# Patient Record
Sex: FEMALE | Race: OTHER | NOT HISPANIC OR LATINO | URBAN - METROPOLITAN AREA
[De-identification: names, ages, dates, MRNs, and addresses within clinical notes are randomized per-mention and may not be internally consistent; named-entity substitution may affect disease eponyms.]

---

## 2017-01-03 RX ORDER — DENOSUMAB 60 MG/ML
60 INJECTION SUBCUTANEOUS ONCE
Qty: 0 | Refills: 0 | Status: DISCONTINUED | OUTPATIENT
Start: 2017-01-04 | End: 2017-01-19

## 2017-01-04 ENCOUNTER — OUTPATIENT (OUTPATIENT)
Dept: OUTPATIENT SERVICES | Facility: HOSPITAL | Age: 82
LOS: 1 days | End: 2017-01-04
Payer: MEDICARE

## 2017-01-04 DIAGNOSIS — M81.0 AGE-RELATED OSTEOPOROSIS WITHOUT CURRENT PATHOLOGICAL FRACTURE: ICD-10-CM

## 2017-01-04 PROCEDURE — 96401 CHEMO ANTI-NEOPL SQ/IM: CPT

## 2017-06-07 ENCOUNTER — APPOINTMENT (OUTPATIENT)
Dept: HEART AND VASCULAR | Facility: CLINIC | Age: 82
End: 2017-06-07

## 2017-06-07 VITALS
BODY MASS INDEX: 20.55 KG/M2 | HEART RATE: 77 BPM | SYSTOLIC BLOOD PRESSURE: 112 MMHG | HEIGHT: 63 IN | DIASTOLIC BLOOD PRESSURE: 72 MMHG | WEIGHT: 116 LBS

## 2017-06-07 DIAGNOSIS — Z78.9 OTHER SPECIFIED HEALTH STATUS: ICD-10-CM

## 2017-06-08 ENCOUNTER — MEDICATION RENEWAL (OUTPATIENT)
Age: 82
End: 2017-06-08

## 2017-06-08 ENCOUNTER — FORM ENCOUNTER (OUTPATIENT)
Age: 82
End: 2017-06-08

## 2017-06-09 ENCOUNTER — OUTPATIENT (OUTPATIENT)
Dept: OUTPATIENT SERVICES | Facility: HOSPITAL | Age: 82
LOS: 1 days | Discharge: HOME | End: 2017-06-09
Payer: MEDICARE

## 2017-06-09 DIAGNOSIS — I48.91 UNSPECIFIED ATRIAL FIBRILLATION: ICD-10-CM

## 2017-06-09 LAB
ANION GAP SERPL CALC-SCNC: 14 MMOL/L — SIGNIFICANT CHANGE UP (ref 5–17)
APTT BLD: 28.8 SEC — SIGNIFICANT CHANGE UP (ref 27.5–37.4)
BASOPHILS NFR BLD AUTO: 0.6 % — SIGNIFICANT CHANGE UP (ref 0–2)
BUN SERPL-MCNC: 36 MG/DL — HIGH (ref 7–23)
CALCIUM SERPL-MCNC: 9.4 MG/DL — SIGNIFICANT CHANGE UP (ref 8.4–10.5)
CHLORIDE SERPL-SCNC: 105 MMOL/L — SIGNIFICANT CHANGE UP (ref 96–108)
CO2 SERPL-SCNC: 21 MMOL/L — LOW (ref 22–31)
CREAT SERPL-MCNC: 0.8 MG/DL — SIGNIFICANT CHANGE UP (ref 0.5–1.3)
EOSINOPHIL NFR BLD AUTO: 1.7 % — SIGNIFICANT CHANGE UP (ref 0–6)
GLUCOSE SERPL-MCNC: 108 MG/DL — HIGH (ref 70–99)
HCT VFR BLD CALC: 38.9 % — SIGNIFICANT CHANGE UP (ref 34.5–45)
HGB BLD-MCNC: 12.6 G/DL — SIGNIFICANT CHANGE UP (ref 11.5–15.5)
INR BLD: 1.18 — HIGH (ref 0.88–1.16)
LYMPHOCYTES # BLD AUTO: 12.8 % — LOW (ref 13–44)
MCHC RBC-ENTMCNC: 30.6 PG — SIGNIFICANT CHANGE UP (ref 27–34)
MCHC RBC-ENTMCNC: 32.4 G/DL — SIGNIFICANT CHANGE UP (ref 32–36)
MCV RBC AUTO: 94.4 FL — SIGNIFICANT CHANGE UP (ref 80–100)
MONOCYTES NFR BLD AUTO: 7.7 % — SIGNIFICANT CHANGE UP (ref 2–14)
NEUTROPHILS NFR BLD AUTO: 77.2 % — HIGH (ref 43–77)
PLATELET # BLD AUTO: 172 K/UL — SIGNIFICANT CHANGE UP (ref 150–400)
POTASSIUM SERPL-MCNC: 4.6 MMOL/L — SIGNIFICANT CHANGE UP (ref 3.5–5.3)
POTASSIUM SERPL-SCNC: 4.6 MMOL/L — SIGNIFICANT CHANGE UP (ref 3.5–5.3)
PROTHROM AB SERPL-ACNC: 13.1 SEC — HIGH (ref 9.8–12.7)
RBC # BLD: 4.12 M/UL — SIGNIFICANT CHANGE UP (ref 3.8–5.2)
RBC # FLD: 15.4 % — SIGNIFICANT CHANGE UP (ref 10.3–16.9)
SODIUM SERPL-SCNC: 140 MMOL/L — SIGNIFICANT CHANGE UP (ref 135–145)
WBC # BLD: 5.3 K/UL — SIGNIFICANT CHANGE UP (ref 3.8–10.5)
WBC # FLD AUTO: 5.3 K/UL — SIGNIFICANT CHANGE UP (ref 3.8–10.5)

## 2017-06-09 PROCEDURE — 85025 COMPLETE CBC W/AUTO DIFF WBC: CPT

## 2017-06-09 PROCEDURE — 92960 CARDIOVERSION ELECTRIC EXT: CPT

## 2017-06-09 PROCEDURE — 93312 ECHO TRANSESOPHAGEAL: CPT

## 2017-06-09 PROCEDURE — 93320 DOPPLER ECHO COMPLETE: CPT | Mod: 26

## 2017-06-09 PROCEDURE — 85730 THROMBOPLASTIN TIME PARTIAL: CPT

## 2017-06-09 PROCEDURE — 93312 ECHO TRANSESOPHAGEAL: CPT | Mod: 26

## 2017-06-09 PROCEDURE — 85610 PROTHROMBIN TIME: CPT

## 2017-06-09 PROCEDURE — 80048 BASIC METABOLIC PNL TOTAL CA: CPT

## 2017-06-09 PROCEDURE — 93325 DOPPLER ECHO COLOR FLOW MAPG: CPT | Mod: 26

## 2017-06-22 ENCOUNTER — APPOINTMENT (OUTPATIENT)
Dept: HEART AND VASCULAR | Facility: CLINIC | Age: 82
End: 2017-06-22

## 2017-06-22 VITALS
SYSTOLIC BLOOD PRESSURE: 126 MMHG | DIASTOLIC BLOOD PRESSURE: 72 MMHG | HEART RATE: 69 BPM | BODY MASS INDEX: 21.26 KG/M2 | HEIGHT: 63 IN | WEIGHT: 120 LBS

## 2017-06-29 ENCOUNTER — OUTPATIENT (OUTPATIENT)
Dept: OUTPATIENT SERVICES | Facility: HOSPITAL | Age: 82
LOS: 1 days | End: 2017-06-29
Payer: MEDICARE

## 2017-06-29 DIAGNOSIS — M81.0 AGE-RELATED OSTEOPOROSIS WITHOUT CURRENT PATHOLOGICAL FRACTURE: ICD-10-CM

## 2017-06-29 PROCEDURE — 96372 THER/PROPH/DIAG INJ SC/IM: CPT

## 2017-06-29 RX ORDER — DENOSUMAB 60 MG/ML
60 INJECTION SUBCUTANEOUS ONCE
Qty: 0 | Refills: 0 | Status: DISCONTINUED | OUTPATIENT
Start: 2017-06-29 | End: 2017-07-14

## 2017-06-30 ENCOUNTER — RX RENEWAL (OUTPATIENT)
Age: 82
End: 2017-06-30

## 2017-10-10 ENCOUNTER — APPOINTMENT (OUTPATIENT)
Dept: HEART AND VASCULAR | Facility: CLINIC | Age: 82
End: 2017-10-10
Payer: MEDICARE

## 2017-10-10 VITALS
BODY MASS INDEX: 18.78 KG/M2 | HEART RATE: 98 BPM | SYSTOLIC BLOOD PRESSURE: 112 MMHG | DIASTOLIC BLOOD PRESSURE: 70 MMHG | OXYGEN SATURATION: 98 % | WEIGHT: 110 LBS | TEMPERATURE: 97.6 F | HEIGHT: 64.17 IN

## 2017-10-10 DIAGNOSIS — Z87.39 PERSONAL HISTORY OF OTHER DISEASES OF THE MUSCULOSKELETAL SYSTEM AND CONNECTIVE TISSUE: ICD-10-CM

## 2017-10-10 DIAGNOSIS — M80.80XK OTHER OSTEOPOROSIS WITH CURRENT PATHOLOGICAL FRACTURE, UNSPECIFIED SITE, SUBSEQUENT ENCOUNTER FOR FRACTURE WITH NONUNION: ICD-10-CM

## 2017-10-10 DIAGNOSIS — M15.9 POLYOSTEOARTHRITIS, UNSPECIFIED: ICD-10-CM

## 2017-10-10 DIAGNOSIS — F41.1 GENERALIZED ANXIETY DISORDER: ICD-10-CM

## 2017-10-10 DIAGNOSIS — Z86.79 PERSONAL HISTORY OF OTHER DISEASES OF THE CIRCULATORY SYSTEM: ICD-10-CM

## 2017-10-10 PROCEDURE — 99214 OFFICE O/P EST MOD 30 MIN: CPT | Mod: 25

## 2017-10-10 PROCEDURE — 90662 IIV NO PRSV INCREASED AG IM: CPT

## 2017-10-10 PROCEDURE — G0008: CPT

## 2017-10-10 PROCEDURE — 36415 COLL VENOUS BLD VENIPUNCTURE: CPT

## 2017-10-11 LAB
ALBUMIN SERPL ELPH-MCNC: 4.4 G/DL
ALP BLD-CCNC: 69 U/L
ALT SERPL-CCNC: 22 U/L
ANION GAP SERPL CALC-SCNC: 15 MMOL/L
AST SERPL-CCNC: 33 U/L
BASOPHILS # BLD AUTO: 0.04 K/UL
BASOPHILS NFR BLD AUTO: 0.7 %
BILIRUB SERPL-MCNC: 2.2 MG/DL
BUN SERPL-MCNC: 27 MG/DL
CALCIUM SERPL-MCNC: 9.9 MG/DL
CHLORIDE SERPL-SCNC: 105 MMOL/L
CO2 SERPL-SCNC: 23 MMOL/L
CREAT SERPL-MCNC: 0.93 MG/DL
EOSINOPHIL # BLD AUTO: 0.1 K/UL
EOSINOPHIL NFR BLD AUTO: 1.7 %
GLUCOSE SERPL-MCNC: 97 MG/DL
HBA1C MFR BLD HPLC: 5.8 %
HCT VFR BLD CALC: 42.6 %
HGB BLD-MCNC: 13.5 G/DL
IMM GRANULOCYTES NFR BLD AUTO: 0 %
LYMPHOCYTES # BLD AUTO: 0.94 K/UL
LYMPHOCYTES NFR BLD AUTO: 15.7 %
MAN DIFF?: NORMAL
MCHC RBC-ENTMCNC: 31.5 PG
MCHC RBC-ENTMCNC: 31.7 GM/DL
MCV RBC AUTO: 99.5 FL
MONOCYTES # BLD AUTO: 0.6 K/UL
MONOCYTES NFR BLD AUTO: 10.1 %
NEUTROPHILS # BLD AUTO: 4.29 K/UL
NEUTROPHILS NFR BLD AUTO: 71.8 %
NT-PROBNP SERPL-MCNC: 5712 PG/ML
PLATELET # BLD AUTO: 173 K/UL
POTASSIUM SERPL-SCNC: 4.5 MMOL/L
PROT SERPL-MCNC: 6.6 G/DL
RBC # BLD: 4.28 M/UL
RBC # FLD: 15 %
SODIUM SERPL-SCNC: 143 MMOL/L
WBC # FLD AUTO: 5.97 K/UL

## 2017-12-12 ENCOUNTER — APPOINTMENT (OUTPATIENT)
Dept: HEART AND VASCULAR | Facility: CLINIC | Age: 82
End: 2017-12-12
Payer: MEDICARE

## 2017-12-12 VITALS
HEART RATE: 69 BPM | TEMPERATURE: 97.3 F | SYSTOLIC BLOOD PRESSURE: 100 MMHG | BODY MASS INDEX: 18.78 KG/M2 | DIASTOLIC BLOOD PRESSURE: 68 MMHG | WEIGHT: 109.99 LBS | OXYGEN SATURATION: 98 % | HEIGHT: 64 IN

## 2017-12-12 PROCEDURE — 99214 OFFICE O/P EST MOD 30 MIN: CPT | Mod: 25

## 2017-12-26 ENCOUNTER — APPOINTMENT (OUTPATIENT)
Dept: HEART AND VASCULAR | Facility: CLINIC | Age: 82
End: 2017-12-26

## 2018-01-22 ENCOUNTER — RX RENEWAL (OUTPATIENT)
Age: 83
End: 2018-01-22

## 2018-02-08 ENCOUNTER — RX RENEWAL (OUTPATIENT)
Age: 83
End: 2018-02-08

## 2018-02-13 ENCOUNTER — APPOINTMENT (OUTPATIENT)
Dept: HEART AND VASCULAR | Facility: CLINIC | Age: 83
End: 2018-02-13
Payer: MEDICARE

## 2018-02-13 VITALS
DIASTOLIC BLOOD PRESSURE: 80 MMHG | SYSTOLIC BLOOD PRESSURE: 140 MMHG | OXYGEN SATURATION: 97 % | HEART RATE: 69 BPM | BODY MASS INDEX: 18.61 KG/M2 | TEMPERATURE: 98 F | WEIGHT: 109 LBS | HEIGHT: 64 IN

## 2018-02-13 DIAGNOSIS — R35.0 FREQUENCY OF MICTURITION: ICD-10-CM

## 2018-02-13 PROCEDURE — 99214 OFFICE O/P EST MOD 30 MIN: CPT

## 2018-02-14 ENCOUNTER — RX RENEWAL (OUTPATIENT)
Age: 83
End: 2018-02-14

## 2018-02-14 LAB
APPEARANCE: ABNORMAL
BACTERIA: ABNORMAL
BILIRUBIN URINE: NEGATIVE
BLOOD URINE: NEGATIVE
CALCIUM OXALATE CRYSTALS: ABNORMAL
COLOR: YELLOW
GLUCOSE QUALITATIVE U: NEGATIVE MG/DL
HYALINE CASTS: 0 /LPF
KETONES URINE: NEGATIVE
LEUKOCYTE ESTERASE URINE: NEGATIVE
MICROSCOPIC-UA: NORMAL
NITRITE URINE: POSITIVE
PH URINE: 6.5
PROTEIN URINE: ABNORMAL MG/DL
RED BLOOD CELLS URINE: 2 /HPF
SPECIFIC GRAVITY URINE: 1.02
SQUAMOUS EPITHELIAL CELLS: 1 /HPF
UROBILINOGEN URINE: NEGATIVE MG/DL
WHITE BLOOD CELLS URINE: 1 /HPF

## 2018-04-24 ENCOUNTER — LABORATORY RESULT (OUTPATIENT)
Age: 83
End: 2018-04-24

## 2018-04-24 ENCOUNTER — APPOINTMENT (OUTPATIENT)
Dept: HEART AND VASCULAR | Facility: CLINIC | Age: 83
End: 2018-04-24
Payer: MEDICARE

## 2018-04-24 VITALS
DIASTOLIC BLOOD PRESSURE: 80 MMHG | BODY MASS INDEX: 19.13 KG/M2 | SYSTOLIC BLOOD PRESSURE: 112 MMHG | WEIGHT: 107.98 LBS | TEMPERATURE: 98.5 F | OXYGEN SATURATION: 99 % | HEIGHT: 63 IN | HEART RATE: 72 BPM

## 2018-04-24 PROCEDURE — 99214 OFFICE O/P EST MOD 30 MIN: CPT | Mod: 25

## 2018-04-24 PROCEDURE — 93000 ELECTROCARDIOGRAM COMPLETE: CPT

## 2018-04-30 ENCOUNTER — RX RENEWAL (OUTPATIENT)
Age: 83
End: 2018-04-30

## 2018-04-30 LAB
APPEARANCE: ABNORMAL
BILIRUBIN URINE: NEGATIVE
BLOOD URINE: ABNORMAL
COLOR: YELLOW
GLUCOSE QUALITATIVE U: NEGATIVE MG/DL
KETONES URINE: NEGATIVE
LEUKOCYTE ESTERASE URINE: ABNORMAL
NITRITE URINE: POSITIVE
PH URINE: 5
PROTEIN URINE: 30 MG/DL
SPECIFIC GRAVITY URINE: 1.02
UROBILINOGEN URINE: NEGATIVE MG/DL

## 2018-06-18 ENCOUNTER — RX RENEWAL (OUTPATIENT)
Age: 83
End: 2018-06-18

## 2018-07-17 ENCOUNTER — APPOINTMENT (OUTPATIENT)
Dept: HEART AND VASCULAR | Facility: CLINIC | Age: 83
End: 2018-07-17
Payer: MEDICARE

## 2018-07-17 VITALS
SYSTOLIC BLOOD PRESSURE: 136 MMHG | BODY MASS INDEX: 20.02 KG/M2 | DIASTOLIC BLOOD PRESSURE: 89 MMHG | WEIGHT: 112.99 LBS | TEMPERATURE: 97.1 F | HEART RATE: 62 BPM | HEIGHT: 63 IN | OXYGEN SATURATION: 98 %

## 2018-07-17 PROCEDURE — 99214 OFFICE O/P EST MOD 30 MIN: CPT

## 2018-10-16 ENCOUNTER — APPOINTMENT (OUTPATIENT)
Dept: HEART AND VASCULAR | Facility: CLINIC | Age: 83
End: 2018-10-16
Payer: MEDICARE

## 2018-10-16 VITALS
WEIGHT: 112.99 LBS | DIASTOLIC BLOOD PRESSURE: 94 MMHG | SYSTOLIC BLOOD PRESSURE: 131 MMHG | TEMPERATURE: 97.5 F | BODY MASS INDEX: 20.02 KG/M2 | OXYGEN SATURATION: 100 % | HEIGHT: 63 IN | HEART RATE: 74 BPM

## 2018-10-16 PROCEDURE — 36415 COLL VENOUS BLD VENIPUNCTURE: CPT

## 2018-10-16 PROCEDURE — 99214 OFFICE O/P EST MOD 30 MIN: CPT

## 2018-10-17 LAB
ALBUMIN SERPL ELPH-MCNC: 4.4 G/DL
ALP BLD-CCNC: 78 U/L
ALT SERPL-CCNC: 16 U/L
ANION GAP SERPL CALC-SCNC: 15 MMOL/L
AST SERPL-CCNC: 20 U/L
BASOPHILS # BLD AUTO: 0.04 K/UL
BASOPHILS NFR BLD AUTO: 0.7 %
BILIRUB SERPL-MCNC: 1.9 MG/DL
BUN SERPL-MCNC: 25 MG/DL
CALCIUM SERPL-MCNC: 9.8 MG/DL
CHLORIDE SERPL-SCNC: 106 MMOL/L
CHOLEST SERPL-MCNC: 185 MG/DL
CHOLEST/HDLC SERPL: 1.9 RATIO
CO2 SERPL-SCNC: 24 MMOL/L
CREAT SERPL-MCNC: 0.83 MG/DL
EOSINOPHIL # BLD AUTO: 0.09 K/UL
EOSINOPHIL NFR BLD AUTO: 1.7 %
GLUCOSE SERPL-MCNC: 102 MG/DL
HBA1C MFR BLD HPLC: 5.9 %
HCT VFR BLD CALC: 42 %
HDLC SERPL-MCNC: 99 MG/DL
HGB BLD-MCNC: 12.9 G/DL
IMM GRANULOCYTES NFR BLD AUTO: 0 %
LDLC SERPL CALC-MCNC: 72 MG/DL
LYMPHOCYTES # BLD AUTO: 0.85 K/UL
LYMPHOCYTES NFR BLD AUTO: 15.8 %
MAN DIFF?: NORMAL
MCHC RBC-ENTMCNC: 30.7 GM/DL
MCHC RBC-ENTMCNC: 31.2 PG
MCV RBC AUTO: 101.7 FL
MONOCYTES # BLD AUTO: 0.38 K/UL
MONOCYTES NFR BLD AUTO: 7.1 %
NEUTROPHILS # BLD AUTO: 4.01 K/UL
NEUTROPHILS NFR BLD AUTO: 74.7 %
NT-PROBNP SERPL-MCNC: 7066 PG/ML
PLATELET # BLD AUTO: 189 K/UL
POTASSIUM SERPL-SCNC: 4.5 MMOL/L
PROT SERPL-MCNC: 6.8 G/DL
RBC # BLD: 4.13 M/UL
RBC # FLD: 15.4 %
SODIUM SERPL-SCNC: 145 MMOL/L
TRIGL SERPL-MCNC: 71 MG/DL
WBC # FLD AUTO: 5.37 K/UL

## 2018-12-18 ENCOUNTER — APPOINTMENT (OUTPATIENT)
Dept: HEART AND VASCULAR | Facility: CLINIC | Age: 83
End: 2018-12-18
Payer: MEDICARE

## 2018-12-18 VITALS
DIASTOLIC BLOOD PRESSURE: 88 MMHG | BODY MASS INDEX: 19.13 KG/M2 | SYSTOLIC BLOOD PRESSURE: 131 MMHG | HEIGHT: 63 IN | HEART RATE: 91 BPM | TEMPERATURE: 97.8 F | OXYGEN SATURATION: 99 % | WEIGHT: 107.98 LBS

## 2018-12-18 PROCEDURE — 99214 OFFICE O/P EST MOD 30 MIN: CPT

## 2018-12-18 PROCEDURE — 93000 ELECTROCARDIOGRAM COMPLETE: CPT

## 2018-12-18 NOTE — PHYSICAL EXAM
[General Appearance - Well Developed] : well developed [Normal Appearance] : normal appearance [Well Groomed] : well groomed [General Appearance - Well Nourished] : well nourished [No Deformities] : no deformities [General Appearance - In No Acute Distress] : no acute distress [Normal Conjunctiva] : the conjunctiva exhibited no abnormalities [Eyelids - No Xanthelasma] : the eyelids demonstrated no xanthelasmas [Normal Oral Mucosa] : normal oral mucosa [No Oral Pallor] : no oral pallor [No Oral Cyanosis] : no oral cyanosis [Respiration, Rhythm And Depth] : normal respiratory rhythm and effort [Exaggerated Use Of Accessory Muscles For Inspiration] : no accessory muscle use [Auscultation Breath Sounds / Voice Sounds] : lungs were clear to auscultation bilaterally [Abdomen Soft] : soft [Abdomen Tenderness] : non-tender [Abdomen Mass (___ Cm)] : no abdominal mass palpated [Abnormal Walk] : normal gait [Nail Clubbing] : no clubbing of the fingernails [Cyanosis, Localized] : no localized cyanosis [Petechial Hemorrhages (___cm)] : no petechial hemorrhages [Skin Color & Pigmentation] : normal skin color and pigmentation [] : no rash [No Venous Stasis] : no venous stasis [Skin Lesions] : no skin lesions [No Skin Ulcers] : no skin ulcer [No Xanthoma] : no  xanthoma was observed [Oriented To Time, Place, And Person] : oriented to person, place, and time [Affect] : the affect was normal [Mood] : the mood was normal [FreeTextEntry1] : Irregular, variable S1, apical systolic murmur

## 2018-12-18 NOTE — ASSESSMENT
[FreeTextEntry1] : A Fib- The patient's [id number] score = 6. Pt failed one DC CV. Continue Eliquis 2.5mg BID.  Spoke to pt and discovered that she is taking it only once daily, will have her take it BID.\par \par SOB/GARCIA- check labs including BNP, needs updated echo (ordered for 4/2019)and never had a stress test. 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 Has made a good recovery except for memory\par \par HTN- stable\par \par MR- Lasix for SOB and edema.  Will update echo, then see if she is a candidate for any MV procedure.\par \par Depression- Sertraline renewed\par \par Urin Frequency- will check UA first.  Had a UTI treated with Cipro.  \par \par Prediabetes- diet reviewed\par \par Health Maintainence- Given new flu shot and Shingles vac @ , needs Shingrix #2(reminded).\par Had Pneumovax around 2015. Had fluzone 2018\par \par LBBB-  never had stress test, will update echo\par

## 2018-12-18 NOTE — HISTORY OF PRESENT ILLNESS
[FreeTextEntry1] : EP- Dr Junior\par Endo- Dr Vaughn\par Dentist- Dr Masterson\par Orthodontist- Dr Chevy Merino [phone]\par Orthodontist- Dr Prater\par \par \par Had Fluzone HD and Shingles vaccine in Dec/Jan 2017-18.  Needs 2nd Shingrix\par Had flu shot for 2018-19.

## 2018-12-18 NOTE — REASON FOR VISIT
[Follow-Up - Clinic] : a clinic follow-up of [Atrial Fibrillation] : atrial fibrillation [Mitral Regurgitation] : mitral regurgitation [FreeTextEntry1] : 82 y/o F presented March 2017 with slurred speech and was unable to find words and dial a phone number.  Found to be in AF and sent to Dr Smith, MRI showed 2 old right CVAs and a new left frontal CVA. Started on Eliquis. Saw Dr Junior who did a TRINA and DC CV which did not hold at 3 weeks f/u.  Pt opted to remain in AF.  Today is feeling well with minor memory issues.  All meds reviewed which revealed she was taking the Eliquis only once daily, she will now take BID. Discussed again, pt taking 2 pills once daily, told to split them 12 hrs apart.  On 10/16/18 pt noted 1 wk of significant GARCIA. No overt CHF.  Differential includes AF, CHF, CAD(never assessed) and MR.\par \par EKG: Atrial Fibrillation with a LBBB and LAD, non specific ST-T wave abnormalities. 12/18/18

## 2018-12-18 NOTE — REVIEW OF SYSTEMS
[Memory Lapses Or Loss] : memory lapses or loss [Anxiety] : anxiety [Negative] : Heme/Lymph [FreeTextEntry3] : frequency

## 2019-01-25 ENCOUNTER — RX RENEWAL (OUTPATIENT)
Age: 84
End: 2019-01-25

## 2019-02-01 ENCOUNTER — RX RENEWAL (OUTPATIENT)
Age: 84
End: 2019-02-01

## 2019-02-26 ENCOUNTER — LABORATORY RESULT (OUTPATIENT)
Age: 84
End: 2019-02-26

## 2019-02-26 ENCOUNTER — APPOINTMENT (OUTPATIENT)
Dept: HEART AND VASCULAR | Facility: CLINIC | Age: 84
End: 2019-02-26
Payer: MEDICARE

## 2019-02-26 VITALS
HEIGHT: 63 IN | WEIGHT: 111.99 LBS | BODY MASS INDEX: 19.84 KG/M2 | OXYGEN SATURATION: 94 % | DIASTOLIC BLOOD PRESSURE: 70 MMHG | SYSTOLIC BLOOD PRESSURE: 110 MMHG | HEART RATE: 65 BPM | TEMPERATURE: 97.3 F

## 2019-02-26 DIAGNOSIS — R53.81 OTHER MALAISE: ICD-10-CM

## 2019-02-26 DIAGNOSIS — R53.83 OTHER MALAISE: ICD-10-CM

## 2019-02-26 PROCEDURE — 36415 COLL VENOUS BLD VENIPUNCTURE: CPT

## 2019-02-26 PROCEDURE — 99214 OFFICE O/P EST MOD 30 MIN: CPT

## 2019-02-26 NOTE — ASSESSMENT
[FreeTextEntry1] : Fatigue- will w/u with a CPK, BNP and TSH.  Never screened for CAD, will do Pharm Nuc.  Wt up 4 lbs, moderate foot edema.  Not taking Lasix, told to resume\par \par A Fib- The patient's [id number] score = 6. Pt failed one DC CV. Continue Eliquis 2.5mg BID.  Spoke to pt and discovered that she is taking it only once daily, will have her take it BID.\par \par SOB/GARCIA- check labs including BNP, needs updated echo (ordered for 4/2019)and never had a stress test. 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 Has made a good recovery except for memory\par \par HTN- stable\par \par MR- Lasix for SOB and edema.  Will update echo, then see if she is a candidate for any MV procedure.\par \par Depression- Sertraline renewed\par \par Urin Frequency- will check UA first.  Had a UTI treated with Cipro.  \par \par Prediabetes- diet reviewed\par \par Health Maintainence- Given new flu shot and Shingles vac @ , needs Shingrix #2(reminded).\par Had Pneumovax around 2015. Had fluzone 2018\par \par LBBB-  never had stress test, will update echo\par

## 2019-02-26 NOTE — HISTORY OF PRESENT ILLNESS
[FreeTextEntry1] : EP- Dr Junior\par Endo- Dr Vaughn\par Dentist- Dr Masterson\par Orthodontist- Dr Chevy Merino [phone]\par Orthodontist- Dr Prater\par \par \par Had Fluzone HD and Shingles vaccine in Dec/Jan 2017-18.  Needs 2nd Shingrix\par Had flu shot for 2018-19.

## 2019-02-26 NOTE — PHYSICAL EXAM
[General Appearance - Well Developed] : well developed [Normal Appearance] : normal appearance [Well Groomed] : well groomed [General Appearance - Well Nourished] : well nourished [No Deformities] : no deformities [General Appearance - In No Acute Distress] : no acute distress [Normal Conjunctiva] : the conjunctiva exhibited no abnormalities [Eyelids - No Xanthelasma] : the eyelids demonstrated no xanthelasmas [Normal Oral Mucosa] : normal oral mucosa [No Oral Pallor] : no oral pallor [No Oral Cyanosis] : no oral cyanosis [Respiration, Rhythm And Depth] : normal respiratory rhythm and effort [Exaggerated Use Of Accessory Muscles For Inspiration] : no accessory muscle use [Auscultation Breath Sounds / Voice Sounds] : lungs were clear to auscultation bilaterally [Abdomen Soft] : soft [Abdomen Tenderness] : non-tender [Abdomen Mass (___ Cm)] : no abdominal mass palpated [Abnormal Walk] : normal gait [Nail Clubbing] : no clubbing of the fingernails [Cyanosis, Localized] : no localized cyanosis [Petechial Hemorrhages (___cm)] : no petechial hemorrhages [Skin Color & Pigmentation] : normal skin color and pigmentation [] : no rash [No Venous Stasis] : no venous stasis [Skin Lesions] : no skin lesions [No Skin Ulcers] : no skin ulcer [No Xanthoma] : no  xanthoma was observed [Oriented To Time, Place, And Person] : oriented to person, place, and time [Affect] : the affect was normal [Mood] : the mood was normal [FreeTextEntry1] : moderate foot edema

## 2019-02-26 NOTE — REASON FOR VISIT
[Follow-Up - Clinic] : a clinic follow-up of [Atrial Fibrillation] : atrial fibrillation [Mitral Regurgitation] : mitral regurgitation [FreeTextEntry1] : 82 y/o F presented March 2017 with slurred speech and was unable to find words and dial a phone number.  Found to be in AF and sent to Dr Smith, MRI showed 2 old right CVAs and a new left frontal CVA. Started on Eliquis. Saw Dr Junior who did a TRINA and DC CV which did not hold at 3 weeks f/u.  Pt opted to remain in AF.  Today is feeling well with minor memory issues.  All meds reviewed which revealed she was taking the Eliquis only once daily, she will now take BID. Discussed again, pt taking 2 pills once daily, told to split them 12 hrs apart.  On 10/16/18 pt noted 1 wk of significant GARCIA. No overt CHF.  Differential includes AF, CHF, CAD(never assessed) and MR.\par \par EKG: Atrial Fibrillation with a LBBB and LAD, non specific ST-T wave abnormalities. 12/18/18\par \par 2/26/19- Here c/o fatigue and weakness.  Will check labs and screen for CAD.

## 2019-02-28 LAB
CK SERPL-CCNC: 50 U/L
TSH SERPL-ACNC: 4.71 UIU/ML

## 2019-03-04 LAB — NT-PROBNP SERPL-MCNC: 7347 PG/ML

## 2019-05-07 ENCOUNTER — APPOINTMENT (OUTPATIENT)
Dept: HEART AND VASCULAR | Facility: CLINIC | Age: 84
End: 2019-05-07
Payer: MEDICARE

## 2019-05-07 ENCOUNTER — LABORATORY RESULT (OUTPATIENT)
Age: 84
End: 2019-05-07

## 2019-05-07 VITALS
BODY MASS INDEX: 20.02 KG/M2 | DIASTOLIC BLOOD PRESSURE: 70 MMHG | OXYGEN SATURATION: 98 % | TEMPERATURE: 97.5 F | SYSTOLIC BLOOD PRESSURE: 110 MMHG | HEART RATE: 72 BPM | WEIGHT: 112.99 LBS | HEIGHT: 63 IN

## 2019-05-07 DIAGNOSIS — R31.0 GROSS HEMATURIA: ICD-10-CM

## 2019-05-07 DIAGNOSIS — R06.02 SHORTNESS OF BREATH: ICD-10-CM

## 2019-05-07 PROCEDURE — 99214 OFFICE O/P EST MOD 30 MIN: CPT

## 2019-05-07 PROCEDURE — 93000 ELECTROCARDIOGRAM COMPLETE: CPT

## 2019-05-07 NOTE — HISTORY OF PRESENT ILLNESS
[FreeTextEntry1] : EP- Dr Junior\par Endo- Dr Vaughn\par Dentist- Dr Masterson\par Orthodontist- Dr Chevy Merino [phone]\par Orthodontist- Dr Prater\par \par \par Had Fluzone HD and Shingles vaccine in Dec/Jan 2017-18.  Needs 2nd Shingrix\par Had flu shot for 2018-19.

## 2019-05-07 NOTE — ASSESSMENT
[FreeTextEntry1] : A Fib- The patient's [id number] score = 6. Pt failed one DC CV. Continue Eliquis 2.5mg BID.  Spoke to pt and discovered that she is taking it only once daily, will have her take it BID.\par \par SOB/GARCIA- check labs including BNP, needs updated echo (ordered for 4/2019)and never had a stress test. 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 Has made a good recovery except for memory\par \par HTN- stable\par \par MR- Lasix for SOB and edema.  Will update echo, then see if she is a candidate for any MV procedure.\par \par Hematuria- x1, send UA, C & S and Cytology.  Refer to Urology\par \par Depression- Sertraline renewed\par \par Urin Frequency- will check UA first.  Had a UTI treated with Cipro.  \par \par Prediabetes- diet reviewed\par \par Health Maintainence- Given new flu shot and Shingles vac @ , needs Shingrix #2(reminded).\par Had Pneumovax around 2015. Had fluzone 2018\par \par LBBB-  never had stress test, will update echo\par

## 2019-05-07 NOTE — REASON FOR VISIT
[Follow-Up - Clinic] : a clinic follow-up of [Atrial Fibrillation] : atrial fibrillation [Mitral Regurgitation] : mitral regurgitation [FreeTextEntry1] : 82 y/o F presented March 2017 with slurred speech and was unable to find words and dial a phone number.  Found to be in AF and sent to Dr Smith, MRI showed 2 old right CVAs and a new left frontal CVA. Started on Eliquis. Saw Dr Junior who did a TRINA and DC CV which did not hold at 3 weeks f/u.  Pt opted to remain in AF.  Today is feeling well with minor memory issues.  All meds reviewed which revealed she was taking the Eliquis only once daily, she will now take BID. Discussed again, pt taking 2 pills once daily, told to split them 12 hrs apart.  On 10/16/18 pt noted 1 wk of significant GARCIA. No overt CHF.  Differential includes AF, CHF, CAD(never assessed) and MR.\par \par EKG: Atrial Fibrillation, PVC,  with a LBBB and LAD, non specific ST-T wave abnormalities. 5/7/19\par \par 5/7/19 Reports hematuria x 1 in mid April.  No recurrence

## 2019-05-08 ENCOUNTER — LABORATORY RESULT (OUTPATIENT)
Age: 84
End: 2019-05-08

## 2019-05-08 LAB
APPEARANCE: ABNORMAL
BILIRUBIN URINE: NEGATIVE
BLOOD URINE: NEGATIVE
COLOR: YELLOW
GLUCOSE QUALITATIVE U: NEGATIVE
KETONES URINE: NEGATIVE
LEUKOCYTE ESTERASE URINE: ABNORMAL
NITRITE URINE: POSITIVE
PH URINE: 5.5
PROTEIN URINE: ABNORMAL
SPECIFIC GRAVITY URINE: 1.02
URINE CYTOLOGY: NORMAL
UROBILINOGEN URINE: NORMAL

## 2019-05-10 ENCOUNTER — RX CHANGE (OUTPATIENT)
Age: 84
End: 2019-05-10

## 2019-05-10 RX ORDER — AMPICILLIN 500 MG/1
500 CAPSULE ORAL
Qty: 21 | Refills: 0 | Status: ACTIVE | COMMUNITY
Start: 2019-05-10 | End: 1900-01-01

## 2019-07-11 ENCOUNTER — RX RENEWAL (OUTPATIENT)
Age: 84
End: 2019-07-11

## 2019-08-05 ENCOUNTER — APPOINTMENT (OUTPATIENT)
Dept: HEART AND VASCULAR | Facility: CLINIC | Age: 84
End: 2019-08-05
Payer: MEDICARE

## 2019-08-05 VITALS
HEIGHT: 63 IN | OXYGEN SATURATION: 97 % | BODY MASS INDEX: 20.02 KG/M2 | HEART RATE: 93 BPM | WEIGHT: 112.99 LBS | SYSTOLIC BLOOD PRESSURE: 110 MMHG | DIASTOLIC BLOOD PRESSURE: 70 MMHG | TEMPERATURE: 97.2 F

## 2019-08-05 DIAGNOSIS — I48.1 PERSISTENT ATRIAL FIBRILLATION: ICD-10-CM

## 2019-08-05 PROCEDURE — 99214 OFFICE O/P EST MOD 30 MIN: CPT

## 2019-08-05 NOTE — HISTORY OF PRESENT ILLNESS
[FreeTextEntry1] : EP- Dr Jnuior\par Endo- Dr Vaughn\par Dentist- Dr Masterson\par Orthodontist- Dr Chevy Merino [phone]\par Orthodontist- Dr Prater\par \par \par Had Fluzone HD and Shingles vaccine in Dec/Jan 2017-18.  Needs 2nd Shingrix\par Had flu shot for 2018-19.

## 2019-08-05 NOTE — PHYSICAL EXAM
[General Appearance - Well Developed] : well developed [Normal Appearance] : normal appearance [Well Groomed] : well groomed [No Deformities] : no deformities [General Appearance - Well Nourished] : well nourished [Normal Conjunctiva] : the conjunctiva exhibited no abnormalities [General Appearance - In No Acute Distress] : no acute distress [Eyelids - No Xanthelasma] : the eyelids demonstrated no xanthelasmas [No Oral Pallor] : no oral pallor [Normal Oral Mucosa] : normal oral mucosa [No Oral Cyanosis] : no oral cyanosis [Respiration, Rhythm And Depth] : normal respiratory rhythm and effort [Auscultation Breath Sounds / Voice Sounds] : lungs were clear to auscultation bilaterally [Exaggerated Use Of Accessory Muscles For Inspiration] : no accessory muscle use [Abdomen Soft] : soft [Abdomen Tenderness] : non-tender [Abdomen Mass (___ Cm)] : no abdominal mass palpated [Abnormal Walk] : normal gait [Cyanosis, Localized] : no localized cyanosis [Nail Clubbing] : no clubbing of the fingernails [Skin Color & Pigmentation] : normal skin color and pigmentation [Petechial Hemorrhages (___cm)] : no petechial hemorrhages [] : no rash [No Venous Stasis] : no venous stasis [Skin Lesions] : no skin lesions [No Skin Ulcers] : no skin ulcer [Oriented To Time, Place, And Person] : oriented to person, place, and time [No Xanthoma] : no  xanthoma was observed [Mood] : the mood was normal [Affect] : the affect was normal [FreeTextEntry1] : Irregular, variable S1, apical systolic murmur S Plasty Text: Given the location and shape of the defect, and the orientation of relaxed skin tension lines, an S-plasty was deemed most appropriate for repair.  Using a sterile surgical marker, the appropriate outline of the S-plasty was drawn, incorporating the defect and placing the expected incisions within the relaxed skin tension lines where possible.  The area thus outlined was incised deep to adipose tissue with a #15 scalpel blade.  The skin margins were undermined to an appropriate distance in all directions utilizing iris scissors. The skin flaps were advanced over the defect.  The opposing margins were then approximated with interrupted buried subcutaneous sutures.

## 2019-08-05 NOTE — ASSESSMENT
[FreeTextEntry1] : A Fib- The patient's [id number] score = 6. Pt failed one DC CV. Continue Eliquis 2.5mg BID.  Spoke to pt and discovered that she is taking it only once daily, will have her take it BID.\par \par SOB/GARCIA- check labs including BNP, needs updated echo (ordered for 4/2019)and never had a stress test. 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 Has made a good recovery except for memory\par \par HTN- stable\par \par MR- Lasix for SOB and edema.  Will update echo, then see if she is a candidate for any MV procedure.\par \par Hematuria- x1, send UA, C & S and Cytology.  Refer to Urology\par \par Depression- Sertraline renewed\par \par Urin Frequency- will check UA first.  Had a UTI treated with Cipro.  \par \par Prediabetes- diet reviewed\par \par Health Maintainence- Given new flu shot and Shingles vac @ , needs Shingrix #2(reminded).\par Had Pneumovax around 2015. Had fluzone 2018\par \par LBBB-  never had stress test, will update echo\par \par Health MAINTAINENCE - MAINTAINS HER CHILDHOOD HOME IN [facility], TOLD TO WATCH FOR TICKS WHEN GARDENING.

## 2019-08-05 NOTE — REASON FOR VISIT
[Follow-Up - Clinic] : a clinic follow-up of [Atrial Fibrillation] : atrial fibrillation [Mitral Regurgitation] : mitral regurgitation [FreeTextEntry1] : 84 y/o F presented March 2017 with slurred speech and was unable to find words and dial a phone number.  Found to be in AF and sent to Dr Smith, MRI showed 2 old right CVAs and a new left frontal CVA. Started on Eliquis. Saw Dr Junior who did a TRINA and DC CV which did not hold at 3 weeks f/u.  Pt opted to remain in AF.  Today is feeling well with minor memory issues.  All meds reviewed which revealed she was taking the Eliquis only once daily, she will now take BID. Discussed again, pt taking 2 pills once daily, told to split them 12 hrs apart.  On 10/16/18 pt noted 1 wk of significant GARCIA. No overt CHF.  Differential includes AF, CHF, CAD(never assessed) and MR.\par \par EKG: Atrial Fibrillation, PVC,  with a LBBB and LAD, non specific ST-T wave abnormalities. 5/7/19\par \par 5/7/19 Reports hematuria x 1 in mid April.  No recurrence\par 8/5/19 c/o mild SOB and fatigue

## 2019-09-12 ENCOUNTER — INPATIENT (INPATIENT)
Facility: HOSPITAL | Age: 84
LOS: 6 days | Discharge: NURSING, REHAB OR LONG-TERM CARE | DRG: 242 | End: 2019-09-19
Attending: INTERNAL MEDICINE | Admitting: INTERNAL MEDICINE
Payer: MEDICARE

## 2019-09-12 ENCOUNTER — APPOINTMENT (OUTPATIENT)
Dept: HEART AND VASCULAR | Facility: CLINIC | Age: 84
End: 2019-09-12
Payer: MEDICARE

## 2019-09-12 VITALS
WEIGHT: 115 LBS | DIASTOLIC BLOOD PRESSURE: 70 MMHG | TEMPERATURE: 97.4 F | BODY MASS INDEX: 20.38 KG/M2 | OXYGEN SATURATION: 95 % | SYSTOLIC BLOOD PRESSURE: 100 MMHG | HEART RATE: 81 BPM | HEIGHT: 63 IN

## 2019-09-12 VITALS
RESPIRATION RATE: 20 BRPM | OXYGEN SATURATION: 99 % | HEIGHT: 65 IN | HEART RATE: 74 BPM | WEIGHT: 115.96 LBS | SYSTOLIC BLOOD PRESSURE: 103 MMHG | DIASTOLIC BLOOD PRESSURE: 68 MMHG | TEMPERATURE: 98 F

## 2019-09-12 DIAGNOSIS — I63.9 CEREBRAL INFARCTION, UNSPECIFIED: ICD-10-CM

## 2019-09-12 DIAGNOSIS — I34.0 NONRHEUMATIC MITRAL (VALVE) INSUFFICIENCY: ICD-10-CM

## 2019-09-12 DIAGNOSIS — I48.91 UNSPECIFIED ATRIAL FIBRILLATION: ICD-10-CM

## 2019-09-12 DIAGNOSIS — I10 ESSENTIAL (PRIMARY) HYPERTENSION: ICD-10-CM

## 2019-09-12 DIAGNOSIS — M81.0 AGE-RELATED OSTEOPOROSIS WITHOUT CURRENT PATHOLOGICAL FRACTURE: ICD-10-CM

## 2019-09-12 DIAGNOSIS — I50.23 ACUTE ON CHRONIC SYSTOLIC (CONGESTIVE) HEART FAILURE: ICD-10-CM

## 2019-09-12 DIAGNOSIS — E78.00 PURE HYPERCHOLESTEROLEMIA, UNSPECIFIED: ICD-10-CM

## 2019-09-12 DIAGNOSIS — Z91.89 OTHER SPECIFIED PERSONAL RISK FACTORS, NOT ELSEWHERE CLASSIFIED: ICD-10-CM

## 2019-09-12 DIAGNOSIS — I50.9 HEART FAILURE, UNSPECIFIED: ICD-10-CM

## 2019-09-12 DIAGNOSIS — I50.22 CHRONIC SYSTOLIC (CONGESTIVE) HEART FAILURE: ICD-10-CM

## 2019-09-12 DIAGNOSIS — R63.8 OTHER SYMPTOMS AND SIGNS CONCERNING FOOD AND FLUID INTAKE: ICD-10-CM

## 2019-09-12 DIAGNOSIS — Z98.890 OTHER SPECIFIED POSTPROCEDURAL STATES: Chronic | ICD-10-CM

## 2019-09-12 DIAGNOSIS — F03.90 UNSPECIFIED DEMENTIA WITHOUT BEHAVIORAL DISTURBANCE: ICD-10-CM

## 2019-09-12 DIAGNOSIS — Z96.7 PRESENCE OF OTHER BONE AND TENDON IMPLANTS: Chronic | ICD-10-CM

## 2019-09-12 DIAGNOSIS — I42.8 OTHER CARDIOMYOPATHIES: ICD-10-CM

## 2019-09-12 LAB
ALBUMIN SERPL ELPH-MCNC: 2.6 G/DL — LOW (ref 3.3–5)
ALP SERPL-CCNC: 82 U/L — SIGNIFICANT CHANGE UP (ref 40–120)
ALT FLD-CCNC: 7 U/L — LOW (ref 10–45)
ANION GAP SERPL CALC-SCNC: 11 MMOL/L — SIGNIFICANT CHANGE UP (ref 5–17)
AST SERPL-CCNC: 15 U/L — SIGNIFICANT CHANGE UP (ref 10–40)
BILIRUB SERPL-MCNC: 1.4 MG/DL — HIGH (ref 0.2–1.2)
BUN SERPL-MCNC: 22 MG/DL — SIGNIFICANT CHANGE UP (ref 7–23)
CALCIUM SERPL-MCNC: 9.1 MG/DL — SIGNIFICANT CHANGE UP (ref 8.4–10.5)
CHLORIDE SERPL-SCNC: 106 MMOL/L — SIGNIFICANT CHANGE UP (ref 96–108)
CO2 SERPL-SCNC: 22 MMOL/L — SIGNIFICANT CHANGE UP (ref 22–31)
CREAT SERPL-MCNC: 0.87 MG/DL — SIGNIFICANT CHANGE UP (ref 0.5–1.3)
GLUCOSE SERPL-MCNC: 121 MG/DL — HIGH (ref 70–99)
MAGNESIUM SERPL-MCNC: 1.7 MG/DL — SIGNIFICANT CHANGE UP (ref 1.6–2.6)
PHOSPHATE SERPL-MCNC: 3.4 MG/DL — SIGNIFICANT CHANGE UP (ref 2.5–4.5)
POTASSIUM SERPL-MCNC: 3.9 MMOL/L — SIGNIFICANT CHANGE UP (ref 3.5–5.3)
POTASSIUM SERPL-SCNC: 3.9 MMOL/L — SIGNIFICANT CHANGE UP (ref 3.5–5.3)
PROT SERPL-MCNC: 5.8 G/DL — LOW (ref 6–8.3)
SODIUM SERPL-SCNC: 139 MMOL/L — SIGNIFICANT CHANGE UP (ref 135–145)
TROPONIN T SERPL-MCNC: <0.01 NG/ML — SIGNIFICANT CHANGE UP (ref 0–0.01)

## 2019-09-12 PROCEDURE — 93000 ELECTROCARDIOGRAM COMPLETE: CPT

## 2019-09-12 PROCEDURE — 93010 ELECTROCARDIOGRAM REPORT: CPT

## 2019-09-12 PROCEDURE — 99215 OFFICE O/P EST HI 40 MIN: CPT

## 2019-09-12 PROCEDURE — 71045 X-RAY EXAM CHEST 1 VIEW: CPT | Mod: 26

## 2019-09-12 PROCEDURE — 93010 ELECTROCARDIOGRAM REPORT: CPT | Mod: 77

## 2019-09-12 PROCEDURE — 93306 TTE W/DOPPLER COMPLETE: CPT

## 2019-09-12 PROCEDURE — 99285 EMERGENCY DEPT VISIT HI MDM: CPT

## 2019-09-12 RX ORDER — SERTRALINE 25 MG/1
1 TABLET, FILM COATED ORAL
Qty: 0 | Refills: 0 | DISCHARGE

## 2019-09-12 RX ORDER — ATORVASTATIN CALCIUM 80 MG/1
10 TABLET, FILM COATED ORAL AT BEDTIME
Refills: 0 | Status: DISCONTINUED | OUTPATIENT
Start: 2019-09-12 | End: 2019-09-19

## 2019-09-12 RX ORDER — FUROSEMIDE 40 MG
1 TABLET ORAL
Qty: 0 | Refills: 0 | DISCHARGE

## 2019-09-12 RX ORDER — CIPROFLOXACIN HYDROCHLORIDE 250 MG/1
250 TABLET, FILM COATED ORAL TWICE DAILY
Qty: 6 | Refills: 1 | Status: DISCONTINUED | COMMUNITY
Start: 2018-02-14 | End: 2019-09-12

## 2019-09-12 RX ORDER — FUROSEMIDE 40 MG
40 TABLET ORAL ONCE
Refills: 0 | Status: DISCONTINUED | OUTPATIENT
Start: 2019-09-12 | End: 2019-09-12

## 2019-09-12 RX ORDER — APIXABAN 2.5 MG/1
2.5 TABLET, FILM COATED ORAL
Refills: 0 | Status: DISCONTINUED | OUTPATIENT
Start: 2019-09-12 | End: 2019-09-13

## 2019-09-12 RX ORDER — INFLUENZA VIRUS VACCINE 15; 15; 15; 15 UG/.5ML; UG/.5ML; UG/.5ML; UG/.5ML
0.5 SUSPENSION INTRAMUSCULAR ONCE
Refills: 0 | Status: COMPLETED | OUTPATIENT
Start: 2019-09-12 | End: 2019-09-13

## 2019-09-12 RX ORDER — ATORVASTATIN CALCIUM 80 MG/1
1 TABLET, FILM COATED ORAL
Qty: 0 | Refills: 0 | DISCHARGE

## 2019-09-12 RX ORDER — FUROSEMIDE 40 MG
20 TABLET ORAL ONCE
Refills: 0 | Status: COMPLETED | OUTPATIENT
Start: 2019-09-12 | End: 2019-09-12

## 2019-09-12 RX ORDER — LISINOPRIL 2.5 MG/1
2.5 TABLET ORAL DAILY
Refills: 0 | Status: DISCONTINUED | OUTPATIENT
Start: 2019-09-12 | End: 2019-09-19

## 2019-09-12 RX ORDER — LISINOPRIL 2.5 MG/1
1 TABLET ORAL
Qty: 0 | Refills: 0 | DISCHARGE

## 2019-09-12 RX ORDER — METOPROLOL TARTRATE 50 MG
50 TABLET ORAL DAILY
Refills: 0 | Status: DISCONTINUED | OUTPATIENT
Start: 2019-09-12 | End: 2019-09-19

## 2019-09-12 RX ORDER — FUROSEMIDE 40 MG
20 TABLET ORAL EVERY 24 HOURS
Refills: 0 | Status: DISCONTINUED | OUTPATIENT
Start: 2019-09-13 | End: 2019-09-13

## 2019-09-12 RX ORDER — SERTRALINE 25 MG/1
50 TABLET, FILM COATED ORAL DAILY
Refills: 0 | Status: DISCONTINUED | OUTPATIENT
Start: 2019-09-12 | End: 2019-09-19

## 2019-09-12 RX ORDER — ALENDRONATE SODIUM 70 MG/1
1 TABLET ORAL
Qty: 0 | Refills: 0 | DISCHARGE

## 2019-09-12 RX ADMIN — Medication 20 MILLIGRAM(S): at 19:46

## 2019-09-12 RX ADMIN — ATORVASTATIN CALCIUM 10 MILLIGRAM(S): 80 TABLET, FILM COATED ORAL at 21:29

## 2019-09-12 NOTE — H&P ADULT - NSICDXPASTMEDICALHX_GEN_ALL_CORE_FT
PAST MEDICAL HISTORY:  Afib     CHF (congestive heart failure)     Hypercholesteremia     Hypertension     Osteoporosis

## 2019-09-12 NOTE — H&P ADULT - ASSESSMENT
83 year old female with medical history of afibb (on eliquis), multiple CVAs, HTN, HLD, and memory deficits (uncertain dementia) admitted from Dr. Rajput's office after presenting with GARCIA of one week's duration, found to have EF of 20% and moderate mitral regurgitation on bedside echocardiogram and widened LBBB on EKG. Patient admitted to telemetry for cardiac monitoring.

## 2019-09-12 NOTE — ED PROVIDER NOTE - CLINICAL SUMMARY MEDICAL DECISION MAKING FREE TEXT BOX
85F pmh afib, chf, HLD, HTN, osteoporosis, urinary freq p/w shortness of breath for past week, moscoos, ble edema mildly worsening in past week. No cp, no n/v, no abd pain, no unilateral weakness, no numbness. Taking all medications as directed, but is not sure what her meds are. PMD Dr. Rajput. Exam noted for irregular heart rhythm, mild ble edema. Concern chf, arrythmia, consider electrolyte abnormality, less likely PNA, less likely PE as pt reported to be on eliquis in EMR. 85F pmh afib, chf, HLD, HTN, osteoporosis, urinary freq p/w shortness of breath for past week, moscoso, ble edema mildly worsening in past week. No cp, no n/v, no abd pain, no unilateral weakness, no numbness. Taking all medications as directed, but is not sure what her meds are. PMD Dr. Alexandre. Exam noted for irregular heart rhythm, mild ble edema. Concern chf, arrythmia, consider electrolyte abnormality, less likely PNA, less likely PE as pt reported to be on eliquis in EMR. Given small lasix due to , weight 52kg. Discussed w/ Dr alexandre, accepted his service.

## 2019-09-12 NOTE — H&P ADULT - PROBLEM SELECTOR PLAN 7
1) PCP Contacted on Admission: (Y/N) --> Name & Phone #: Dr. Rajput  2) Date of Contact with PCP: 9/12/13  3) PCP Contacted at Discharge: (Y/N)  4) Summary of Handoff Given to PCP:   5) Post-Discharge Appointment Date and Location:

## 2019-09-12 NOTE — H&P ADULT - NSHPPHYSICALEXAM_GEN_ALL_CORE
.  VITAL SIGNS:  T(C): 36.4 (09-12-19 @ 18:53), Max: 36.4 (09-12-19 @ 13:34)  T(F): 97.5 (09-12-19 @ 18:53), Max: 97.6 (09-12-19 @ 13:34)  HR: 74 (09-12-19 @ 20:20) (64 - 74)  BP: 103/71 (09-12-19 @ 20:20) (96/62 - 103/71)  BP(mean): 79 (09-12-19 @ 20:20) (66 - 79)  RR: 30 (09-12-19 @ 20:20) (17 - 30)  SpO2: 93% (09-12-19 @ 20:20) (93% - 99%)  Wt(kg): --    PHYSICAL EXAM:    Constitutional: WDWN pleasant thin elderly female resting comfortably in bed; NAD on RA   Head: NC/AT; PERRL, EOMI, clear conjunctiva; MMM; poor dentition   Neck: supple; no JVD; no cervical or submandibular lymphadenopathy   Respiratory: CTA B/L; no W/R/R, no retractions  Cardiac: +S1/S2; RRR; no M/R/G; PMI non-displaced  Gastrointestinal: soft, NT/ND; no rebound or guarding; +BSx4  Back: kyphoscolioses noted   Extremities: WWP, no clubbing or cyanosis; 1+ pitting edema bilaterally R>L   Musculoskeletal: NROM x4; no joint swelling, tenderness or erythema; left arthritic upper extremity deformity noted  Vascular: 2+ radial, femoral, DP/PT pulses B/L  Neurologic: AAOx3; CNII-XII grossly intact; no focal deficits  Psychiatric: affect and characteristics appropriate

## 2019-09-12 NOTE — H&P ADULT - PROBLEM SELECTOR PLAN 5
Lapses in memory noted on exam, with confusion and possible memory deficits. Unclear baseline. Possibly attributed to hx of CVAs.   -consider CTH in AM

## 2019-09-12 NOTE — H&P ADULT - PROBLEM SELECTOR PLAN 3
History of osteoporosis. Deformities noted on exam  -Patient unable to clarify last dose of alendronate  -unable to restart inpatient, non-formulary weekly medication

## 2019-09-12 NOTE — H&P ADULT - NSHPLABSRESULTS_GEN_ALL_CORE
.  LABS:                         13.3   8.98  )-----------( 262      ( 12 Sep 2019 14:11 )             42.6     09-12    139  |  106  |  22  ----------------------------<  121<H>  3.9   |  22  |  0.87    Ca    9.1      12 Sep 2019 18:41  Phos  3.4     09-12  Mg     1.7     09-12    TPro  5.8<L>  /  Alb  2.6<L>  /  TBili  1.4<H>  /  DBili  x   /  AST  15  /  ALT  7<L>  /  AlkPhos  82  09-12    PT/INR - ( 12 Sep 2019 14:11 )   PT: 15.6 sec;   INR: 1.37          PTT - ( 12 Sep 2019 14:11 )  PTT:29.8 sec    CARDIAC MARKERS ( 12 Sep 2019 14:11 )  x     / <0.01 ng/mL / 34 U/L / x     / 1.9 ng/mL      Serum Pro-Brain Natriuretic Peptide: 97176 pg/mL (09-12 @ 14:11)        RADIOLOGY, EKG & ADDITIONAL TESTS: Reviewed.

## 2019-09-12 NOTE — ED ADULT NURSE NOTE - PMH
Afib    CHF (congestive heart failure) Afib    CHF (congestive heart failure)    Hypercholesteremia    Hypertension    Osteoporosis    Urinary frequency

## 2019-09-12 NOTE — PHYSICAL EXAM
[General Appearance - Well Developed] : well developed [Well Groomed] : well groomed [Normal Appearance] : normal appearance [General Appearance - Well Nourished] : well nourished [No Deformities] : no deformities [General Appearance - In No Acute Distress] : no acute distress [Eyelids - No Xanthelasma] : the eyelids demonstrated no xanthelasmas [Normal Conjunctiva] : the conjunctiva exhibited no abnormalities [Normal Oral Mucosa] : normal oral mucosa [No Oral Pallor] : no oral pallor [No Oral Cyanosis] : no oral cyanosis [Respiration, Rhythm And Depth] : normal respiratory rhythm and effort [Exaggerated Use Of Accessory Muscles For Inspiration] : no accessory muscle use [Auscultation Breath Sounds / Voice Sounds] : lungs were clear to auscultation bilaterally [Abdomen Soft] : soft [Abdomen Tenderness] : non-tender [Abdomen Mass (___ Cm)] : no abdominal mass palpated [Abnormal Walk] : normal gait [Nail Clubbing] : no clubbing of the fingernails [Cyanosis, Localized] : no localized cyanosis [Petechial Hemorrhages (___cm)] : no petechial hemorrhages [Skin Color & Pigmentation] : normal skin color and pigmentation [] : no rash [No Venous Stasis] : no venous stasis [No Skin Ulcers] : no skin ulcer [Skin Lesions] : no skin lesions [No Xanthoma] : no  xanthoma was observed [Oriented To Time, Place, And Person] : oriented to person, place, and time [Mood] : the mood was normal [Affect] : the affect was normal [FreeTextEntry1] : Irregular, variable S1, apical systolic murmur

## 2019-09-12 NOTE — H&P ADULT - HISTORY OF PRESENT ILLNESS
83 year old female (poor historian) with medical history of afibb (on eliquis), multiple CVAs, HTN, HLD, and memory deficits (uncertain dementia) admitted from Dr. Rajput's office after presenting with GARCIA of one week's duration, found to have EF of 20% and moderate mitral regurgitation on bedside echocardiogram. EKG in office indicative of BiV pacing with LBBB that appears widened. Patient is accompanied by friend, who is able to partially fill in gaps related to HPI. Patient indicates was extremely active and able to complete ADL alone without symptoms, however over past has had increased SOB and been unable to walk 10 feet without becoming SOB. Friend indicates patient has had difficulty maneuvering around apartment and holds on to wall for support, with increased breathing. Patient denies sick contacts or recent travel. Patient currently denies CP, SOB, lower extremity edema,  n/v/changes in bowel habits.

## 2019-09-12 NOTE — ED ADULT NURSE NOTE - NSIMPLEMENTINTERV_GEN_ALL_ED
Implemented All Fall with Harm Risk Interventions:  [place] to call system. Call bell, personal items and telephone within reach. Instruct patient to call for assistance. Room bathroom lighting operational. Non-slip footwear when patient is off stretcher. Physically safe environment: no spills, clutter or unnecessary equipment. Stretcher in lowest position, wheels locked, appropriate side rails in place. Provide visual cue, wrist band, yellow gown, etc. Monitor gait and stability. Monitor for mental status changes and reorient to person, place, and time. Review medications for side effects contributing to fall risk. Reinforce activity limits and safety measures with patient and family. Provide visual clues: red socks.

## 2019-09-12 NOTE — ED PROVIDER NOTE - NOTES
Discussed w/ Dr. Rajput, notes hx of dx afib & stroke in 2017, cardioverted w/o lasting effect, started on AC. Today noted in clinic for worsening BLE edema, worse mitral regurg w/ EF 20% on echo, new LBBB. Anticipates possible pacer placement. Recs

## 2019-09-12 NOTE — ED ADULT NURSE NOTE - OBJECTIVE STATEMENT
Pt came to ED under direction of MD Rajput for dyspnea upon exertion for several weeks. Pt reports she can't walk from her apt into the [place]ak[place].  Bilateral ankle swelling noted. Pt lung sounds clear.  Pt speaking in complete sentences at this time. Pt denies chest pain, abd pain, /GI symptoms, nausea or vomiting. Pt reports intermittent dizziness with dyspnea.  Pt is alert and oriented x3.

## 2019-09-12 NOTE — H&P ADULT - NSHPSOCIALHISTORY_GEN_ALL_CORE
Patient lives alone, neighbors recently helped with ADLs.   Patient denies smoking.  Patient drinks 2 bloody wendy's per nigh (questionable).  Patient sexually active with men (declines HIV/STD testing)

## 2019-09-12 NOTE — ED PROVIDER NOTE - OBJECTIVE STATEMENT
85F pmh afib, chf, HLD, HTN, osteoporosis, urinary freq p/w shortness of breath for past week, moscoso, ble edema mildly worsening in past week. No cp, no n/v, no abd pain, no unilateral weakness, no numbness. Taking all medications as directed, but is not sure what her meds are.   PMD Dr. Rajput.

## 2019-09-12 NOTE — H&P ADULT - PROBLEM SELECTOR PLAN 4
History of CVA (most recent March 2017), possibly due to afibb  -c/w with eliquis  -will not require lovenox

## 2019-09-12 NOTE — ED PROVIDER NOTE - PHYSICAL EXAMINATION
VITAL SIGNS: I have reviewed nursing notes and confirm.  CONSTITUTIONAL: Well-developed; well-nourished; in no acute distress.  SKIN: Skin is warm and dry, no acute rash.  HEAD: Normocephalic; atraumatic.  EYES:  EOM intact; conjunctiva and sclera clear.  ENT: No nasal discharge; airway clear.  NECK: Supple; Voluntary FROM  CARD: No rubs appreciated, irregular rate and rhythm.  RESP: No wheezes, no rales. No respiratory distress  ABD: Soft; non-distended; non-tender; no rebound or guarding  EXT: Normal ROM. No cyanosis. 1+ BLE edema  NEURO: Alert, oriented. Grossly unremarkable.  PSYCH: Cooperative, appropriate.

## 2019-09-12 NOTE — H&P ADULT - PROBLEM SELECTOR PLAN 1
Hx of CHF. s/p 20mg IV lasix push. Patient mildly fluid overlaoded with bilateral lower extremity edema  -obtain collateral regarding previous echo findings  -f/u AM chest x-rya  -trend trops  -EP consulted for possible pacemaker placement in setting of widened LBBB and low EF f/u reccs  -HF team consulted f.u reccs

## 2019-09-12 NOTE — ASSESSMENT
[FreeTextEntry1] : A Fib- The patient's [id number] score = 6. Pt failed one DC CV. Continue Eliquis 2.5mg BID.  Spoke to pt and discovered that she is taking it only once daily, will have her take it BID.\par \par SOB/GARCIA- check labs including BNP, needs updated echo (ordered for 4/2019)and never had a stress test. Still needed.  Echo done 9/12/19, EF 20%, moderate MR. Will admit to [facility] for IV diuresis.  Will need BiV pacer\par \par CVA- Due to A F. Has made a good recovery except for memory\par \par HTN- stable\par \par MR- Lasix for SOB and edema.  Will update echo, then see if she is a candidate for any MV or pacing procedure.\par \par Hematuria- x1, send UA, C & S and Cytology.  Refer to Urology\par \par Depression- Sertraline renewed\par \par Urin Frequency- will check UA first.  Had a UTI treated with Cipro.  \par \par Prediabetes- diet reviewed\par \par Health Maintainence- Given new flu shot and Shingles vac @ , needs Shingrix #2(reminded).\par Had Pneumovax around 2015. Had fluzone 2018\par \par LBBB-  never had stress test, will update echo\par \par Health MAINTAINENCE - MAINTAINS HER CHILDHOOD HOME IN [facility], TOLD TO WATCH FOR TICKS WHEN GARDENING.

## 2019-09-12 NOTE — H&P ADULT - PROBLEM SELECTOR PLAN 6
F: tolerating PO, no IVF  E: replete K<4, Mg<2  N: Dash/TLC    VTE Prophylaxis: Lovenox SubQ  C: Full Code  D: 5LACH

## 2019-09-12 NOTE — ED PROVIDER NOTE - PMH
Afib    CHF (congestive heart failure)    Hypercholesteremia    Hypertension    Osteoporosis    Urinary frequency

## 2019-09-12 NOTE — HISTORY OF PRESENT ILLNESS
[FreeTextEntry1] : EP- Dr Junior\par Endo- Dr Vaughn\par Dentist- Dr Masterson\par Orthodontist- Dr Chevy Merino [phone]\par Orthodontist- Dr Prater\par Jessicayocasta Toscano  [phone]\par Jessicayocasta Olivas [phone]\par \par \par Had Fluzone HD and Shingles vaccine in Dec/Jan 2017-18.  Had 2nd Shingrix\par Had flu shot for 2018-19.

## 2019-09-12 NOTE — REASON FOR VISIT
[Follow-Up - Clinic] : a clinic follow-up of [Atrial Fibrillation] : atrial fibrillation [Mitral Regurgitation] : mitral regurgitation [FreeTextEntry1] : 84 y/o F presented March 2017 with slurred speech and was unable to find words and dial a phone number.  Found to be in AF and sent to Dr Smith, MRI showed 2 old right CVAs and a new left frontal CVA. Started on Eliquis. Saw Dr Junior who did a TRINA and DC CV which did not hold at 3 weeks f/u.  Pt opted to remain in AF.  Today is feeling well with minor memory issues.  All meds reviewed which revealed she was taking the Eliquis only once daily, she will now take BID. Discussed again, pt taking 2 pills once daily, told to split them 12 hrs apart.  On 10/16/18 pt noted 1 wk of significant GARCIA. No overt CHF.  Differential includes AF, CHF, CAD(never assessed) and MR.9/12/19\par  Here with severe GARCIA, wt up.\par \par EKG: Atrial Fibrillation, PVC,  with a LBBB and LAD, non specific ST-T wave abnormalities. 5/7/19\par \par 5/7/19 Reports hematuria x 1 in mid April.  No recurrence\par 8/5/19 c/o mild SOB and fatigue

## 2019-09-12 NOTE — ED ADULT TRIAGE NOTE - CHIEF COMPLAINT QUOTE
Pt BIBA from outpatient appt. CO worsening SOB/ GARCIA.  Pt has known Hx of Afib and CHF and is A&O to self and place at baseline.  Pt accompanied by neighbors and state "Her HCP is on their way here now."  EKG in progress.

## 2019-09-13 DIAGNOSIS — I48.2 CHRONIC ATRIAL FIBRILLATION: ICD-10-CM

## 2019-09-13 DIAGNOSIS — I50.9 HEART FAILURE, UNSPECIFIED: ICD-10-CM

## 2019-09-13 DIAGNOSIS — I50.23 ACUTE ON CHRONIC SYSTOLIC (CONGESTIVE) HEART FAILURE: ICD-10-CM

## 2019-09-13 DIAGNOSIS — I44.7 LEFT BUNDLE-BRANCH BLOCK, UNSPECIFIED: ICD-10-CM

## 2019-09-13 LAB
ANION GAP SERPL CALC-SCNC: 10 MMOL/L — SIGNIFICANT CHANGE UP (ref 5–17)
BUN SERPL-MCNC: 23 MG/DL — SIGNIFICANT CHANGE UP (ref 7–23)
CALCIUM SERPL-MCNC: 8.9 MG/DL — SIGNIFICANT CHANGE UP (ref 8.4–10.5)
CHLORIDE SERPL-SCNC: 104 MMOL/L — SIGNIFICANT CHANGE UP (ref 96–108)
CO2 SERPL-SCNC: 24 MMOL/L — SIGNIFICANT CHANGE UP (ref 22–31)
CREAT SERPL-MCNC: 0.95 MG/DL — SIGNIFICANT CHANGE UP (ref 0.5–1.3)
GLUCOSE SERPL-MCNC: 107 MG/DL — HIGH (ref 70–99)
HCT VFR BLD CALC: 37.6 % — SIGNIFICANT CHANGE UP (ref 34.5–45)
HGB BLD-MCNC: 12.1 G/DL — SIGNIFICANT CHANGE UP (ref 11.5–15.5)
MAGNESIUM SERPL-MCNC: 1.6 MG/DL — SIGNIFICANT CHANGE UP (ref 1.6–2.6)
MCHC RBC-ENTMCNC: 30.8 PG — SIGNIFICANT CHANGE UP (ref 27–34)
MCHC RBC-ENTMCNC: 32.2 GM/DL — SIGNIFICANT CHANGE UP (ref 32–36)
MCV RBC AUTO: 95.7 FL — SIGNIFICANT CHANGE UP (ref 80–100)
NRBC # BLD: 0 /100 WBCS — SIGNIFICANT CHANGE UP (ref 0–0)
PHOSPHATE SERPL-MCNC: 3.6 MG/DL — SIGNIFICANT CHANGE UP (ref 2.5–4.5)
PLATELET # BLD AUTO: 224 K/UL — SIGNIFICANT CHANGE UP (ref 150–400)
POTASSIUM SERPL-MCNC: 3.6 MMOL/L — SIGNIFICANT CHANGE UP (ref 3.5–5.3)
POTASSIUM SERPL-SCNC: 3.6 MMOL/L — SIGNIFICANT CHANGE UP (ref 3.5–5.3)
RBC # BLD: 3.93 M/UL — SIGNIFICANT CHANGE UP (ref 3.8–5.2)
RBC # FLD: 15.5 % — HIGH (ref 10.3–14.5)
SODIUM SERPL-SCNC: 138 MMOL/L — SIGNIFICANT CHANGE UP (ref 135–145)
WBC # BLD: 9.4 K/UL — SIGNIFICANT CHANGE UP (ref 3.8–10.5)
WBC # FLD AUTO: 9.4 K/UL — SIGNIFICANT CHANGE UP (ref 3.8–10.5)

## 2019-09-13 PROCEDURE — 99223 1ST HOSP IP/OBS HIGH 75: CPT

## 2019-09-13 PROCEDURE — 71045 X-RAY EXAM CHEST 1 VIEW: CPT | Mod: 26

## 2019-09-13 PROCEDURE — 93306 TTE W/DOPPLER COMPLETE: CPT | Mod: 26

## 2019-09-13 PROCEDURE — 99222 1ST HOSP IP/OBS MODERATE 55: CPT

## 2019-09-13 PROCEDURE — 70450 CT HEAD/BRAIN W/O DYE: CPT | Mod: 26

## 2019-09-13 RX ORDER — APIXABAN 2.5 MG/1
5 TABLET, FILM COATED ORAL EVERY 12 HOURS
Refills: 0 | Status: DISCONTINUED | OUTPATIENT
Start: 2019-09-13 | End: 2019-09-19

## 2019-09-13 RX ORDER — FUROSEMIDE 40 MG
20 TABLET ORAL ONCE
Refills: 0 | Status: COMPLETED | OUTPATIENT
Start: 2019-09-13 | End: 2019-09-13

## 2019-09-13 RX ADMIN — Medication 50 MILLIGRAM(S): at 12:16

## 2019-09-13 RX ADMIN — APIXABAN 2.5 MILLIGRAM(S): 2.5 TABLET, FILM COATED ORAL at 06:46

## 2019-09-13 RX ADMIN — APIXABAN 5 MILLIGRAM(S): 2.5 TABLET, FILM COATED ORAL at 18:13

## 2019-09-13 RX ADMIN — SERTRALINE 50 MILLIGRAM(S): 25 TABLET, FILM COATED ORAL at 18:13

## 2019-09-13 RX ADMIN — INFLUENZA VIRUS VACCINE 0.5 MILLILITER(S): 15; 15; 15; 15 SUSPENSION INTRAMUSCULAR at 06:56

## 2019-09-13 RX ADMIN — LISINOPRIL 2.5 MILLIGRAM(S): 2.5 TABLET ORAL at 06:46

## 2019-09-13 RX ADMIN — ATORVASTATIN CALCIUM 10 MILLIGRAM(S): 80 TABLET, FILM COATED ORAL at 21:16

## 2019-09-13 RX ADMIN — Medication 20 MILLIGRAM(S): at 12:16

## 2019-09-13 NOTE — CONSULT NOTE ADULT - ASSESSMENT
85 year old female (poor historian) w/ pmh of atrial fibrillation s/p failed DCCV 6/9/17 (on Eliquis), multiple CVAs (most recent March 2017), HTN, Hyperlipidemia, and memory deficits (uncertain dementia) admitted from Dr. Rajput's office after presenting with dyspnea on exertion x 1 week, found to have EF of 20% and moderate mitral regurgitation on bedside echocardiogram. EKG in office w/ widened LBBB. EP consulted for evaluation for BiV PPM given widened QRS and reduced EF 20%. 85 year old female (poor historian) w/ pmh of atrial fibrillation s/p failed DCCV 6/9/17 (on Eliquis), multiple CVAs (most recent March 2017), HTN, Hyperlipidemia, and memory deficits (uncertain dementia) admitted from Dr. Rajput's office after presenting with dyspnea on exertion x 1 week, found to have EF of 20% and moderate mitral regurgitation on bedside echocardiogram. EKG in office w/ widened LBBB. EP consulted for evaluation for BiV PPM given widened QRS and reduced EF.

## 2019-09-13 NOTE — PHYSICAL THERAPY INITIAL EVALUATION ADULT - ADDITIONAL COMMENTS
Patient is unreliable historian. Patient lives in elevator apartment, no steps to enter. Patient denies DME, home health assistance, or history of falls.

## 2019-09-13 NOTE — PROGRESS NOTE ADULT - SUBJECTIVE AND OBJECTIVE BOX
**Incomplete Note**      OVERNIGHT EVENTS:    SUBJECTIVE / INTERVAL HPI: Patient seen and examined at bedside.     VITAL SIGNS:  Vital Signs Last 24 Hrs  T(C): 36.6 (12 Sep 2019 22:01), Max: 36.6 (12 Sep 2019 22:01)  T(F): 97.9 (12 Sep 2019 22:01), Max: 97.9 (12 Sep 2019 22:01)  HR: 68 (13 Sep 2019 00:04) (64 - 74)  BP: 117/74 (13 Sep 2019 00:04) (96/62 - 117/74)  BP(mean): 88 (13 Sep 2019 00:04) (66 - 88)  RR: 25 (13 Sep 2019 00:04) (17 - 30)  SpO2: 93% (13 Sep 2019 00:04) (93% - 99%)    PHYSICAL EXAM:    General: WDWN  HEENT: NC/AT; PERRL, anicteric sclera; MMM  Neck: supple  Cardiovascular: +S1/S2; RRR  Respiratory: CTA B/L; no W/R/R  Gastrointestinal: soft, NT/ND; +BSx4  Extremities: WWP; no edema, clubbing or cyanosis  Vascular: 2+ radial, DP/PT pulses B/L  Neurological: AAOx3; no focal deficits    MEDICATIONS:  MEDICATIONS  (STANDING):  apixaban 2.5 milliGRAM(s) Oral two times a day  atorvastatin 10 milliGRAM(s) Oral at bedtime  furosemide   Injectable 20 milliGRAM(s) IV Push every 24 hours  influenza   Vaccine 0.5 milliLiter(s) IntraMuscular once  lisinopril 2.5 milliGRAM(s) Oral daily  metoprolol succinate ER 50 milliGRAM(s) Oral daily  sertraline 50 milliGRAM(s) Oral daily    MEDICATIONS  (PRN):      ALLERGIES:  Allergies    No Known Allergies    Intolerances        LABS:                        13.3   8.98  )-----------( 262      ( 12 Sep 2019 14:11 )             42.6     09-12    139  |  106  |  22  ----------------------------<  121<H>  3.9   |  22  |  0.87    Ca    9.1      12 Sep 2019 18:41  Phos  3.4     09-12  Mg     1.7     09-12    TPro  5.8<L>  /  Alb  2.6<L>  /  TBili  1.4<H>  /  DBili  x   /  AST  15  /  ALT  7<L>  /  AlkPhos  82  09-12    PT/INR - ( 12 Sep 2019 14:11 )   PT: 15.6 sec;   INR: 1.37          PTT - ( 12 Sep 2019 14:11 )  PTT:29.8 sec    CAPILLARY BLOOD GLUCOSE          RADIOLOGY & ADDITIONAL TESTS: Reviewed.    ASSESSMENT:    PLAN: OVERNIGHT EVENTS: No acute events overnight. UOP net negative 300cc upon arrival to floor. Patient HD stable.     SUBJECTIVE / INTERVAL HPI: Patient seen and examined at bedside this AM. No acute events overnight. Patient has no complaints at this time. Patient denies chest pain, shortness of breath, palpitations, nausea, vomiting, dizziness or changes in bowel habits. Patient notes increased urination.       VITAL SIGNS:  Vital Signs Last 24 Hrs  T(C): 36.6 (12 Sep 2019 22:01), Max: 36.6 (12 Sep 2019 22:01)  T(F): 97.9 (12 Sep 2019 22:01), Max: 97.9 (12 Sep 2019 22:01)  HR: 68 (13 Sep 2019 00:04) (64 - 74)  BP: 117/74 (13 Sep 2019 00:04) (96/62 - 117/74)  BP(mean): 88 (13 Sep 2019 00:04) (66 - 88)  RR: 25 (13 Sep 2019 00:04) (17 - 30)  SpO2: 93% (13 Sep 2019 00:04) (93% - 99%)    PHYSICAL EXAM:    General: thin, pleasant elderly female resting comfortably in bed, eating breakfast   HEENT: NC/AT; PERRL, anicteric sclera; MMM  Neck: supple, no cervical or submandibular lymphadenopathy; no JVD   Cardiovascular: +S1/S2; RRR; no murmurs, rubs, or gallops  Respiratory: CTA, decreased breath sounds L>R; no W/R/R  Gastrointestinal: soft, NT/ND; +BSx4  Extremities: 2+ pitting edema bilaterally; WWP; no clubbing or cyanosis  Vascular: 2+ radial, DP/PT pulses B/L  Neurological: AAOx4; no focal deficits  Psych: Patient appears confused at times during conversation, questionable memory deficit     MEDICATIONS:  MEDICATIONS  (STANDING):  apixaban 2.5 milliGRAM(s) Oral two times a day  atorvastatin 10 milliGRAM(s) Oral at bedtime  furosemide   Injectable 20 milliGRAM(s) IV Push every 24 hours  influenza   Vaccine 0.5 milliLiter(s) IntraMuscular once  lisinopril 2.5 milliGRAM(s) Oral daily  metoprolol succinate ER 50 milliGRAM(s) Oral daily  sertraline 50 milliGRAM(s) Oral daily    MEDICATIONS  (PRN):      ALLERGIES:  Allergies    No Known Allergies    Intolerances        LABS:                        13.3   8.98  )-----------( 262      ( 12 Sep 2019 14:11 )             42.6     09-12    139  |  106  |  22  ----------------------------<  121<H>  3.9   |  22  |  0.87    Ca    9.1      12 Sep 2019 18:41  Phos  3.4     09-12  Mg     1.7     09-12    TPro  5.8<L>  /  Alb  2.6<L>  /  TBili  1.4<H>  /  DBili  x   /  AST  15  /  ALT  7<L>  /  AlkPhos  82  09-12    PT/INR - ( 12 Sep 2019 14:11 )   PT: 15.6 sec;   INR: 1.37          PTT - ( 12 Sep 2019 14:11 )  PTT:29.8 sec    CAPILLARY BLOOD GLUCOSE          RADIOLOGY & ADDITIONAL TESTS: Reviewed.    ASSESSMENT:    PLAN:

## 2019-09-13 NOTE — CONSULT NOTE ADULT - SUBJECTIVE AND OBJECTIVE BOX
Electrophysiology Consult Note:     CHIEF COMPLAINT:  Patient is a 85y old Female who presents with a chief complaint of CHF, low EF (13 Sep 2019 08:29)        HISTORY OF PRESENT ILLNESS:   85 year old female (poor historian) w/ pmh of atrial fibrillation s/p failed DCCV 17 (on Eliquis), multiple CVAs (most recent 2017), HTN, Hyperlipidemia, and memory deficits (uncertain dementia) admitted from Dr. Rajput's office after presenting with dyspnea on exertion x 1 week, found to have EF of 20% and moderate mitral regurgitation on bedside echocardiogram. EKG in office w/ widened LBBB. Patient is accompanied by friend, who is able to partially fill in gaps related to HPI. Patient indicates was extremely active and able to complete ADL alone without symptoms, however over past has had increased SOB and been unable to walk 10 feet without becoming SOB. Friend indicates patient has had difficulty maneuvering around apartment and holds on to wall for support, with increased breathing. Patient denies sick contacts or recent travel. Patient currently denies CP, SOB, lower extremity edema,  n/v/changes in bowel habits.   EP consulted for evaluation for BiV PPM given widened QRS and EF 20%.         PAST MEDICAL & SURGICAL HISTORY:  Osteoporosis s/p wrist fracture   Hyperlipidemia   Hypertension  Afib (on Eliquis)  memory deficit (uncertain dementia)  multiple CVAs (most recent 2017)  History of facelift      FAMILY HISTORY:  No pertinent family history in first degree relatives      SOCIAL HISTORY:    [ ] Non-smoker  [ ] Smoker  [ ] Alcohol  [ ] illicit drugs    Allergies    No Known Allergies    Intolerances    	    MEDICATIONS:  MEDICATIONS  (STANDING):  apixaban 2.5 milliGRAM(s) Oral two times a day  atorvastatin 10 milliGRAM(s) Oral at bedtime  furosemide   Injectable 20 milliGRAM(s) IV Push once  lisinopril 2.5 milliGRAM(s) Oral daily  metoprolol succinate ER 50 milliGRAM(s) Oral daily  sertraline 50 milliGRAM(s) Oral daily    MEDICATIONS  (PRN):        REVIEW OF SYSTEMS:  CONSTITUTIONAL: No fever, weight loss, or fatigue  EYES: No eye pain, visual disturbances, or discharge  ENMT:  No difficulty hearing, tinnitus, vertigo; No sinus or throat pain  NECK: No pain or stiffness  BREASTS: No pain, masses, or nipple discharge  RESPIRATORY: No cough, wheezing, chills or hemoptysis; No Shortness of Breath  CARDIOVASCULAR: No chest pain, palpitations, dizziness, or leg swelling  GASTROINTESTINAL: No abdominal or epigastric pain. No nausea, vomiting, or hematemesis; No diarrhea or constipation. No melena or hematochezia.  GENITOURINARY: No dysuria, frequency, hematuria, or incontinence  NEUROLOGICAL: No headaches, memory loss, loss of strength, numbness, or tremors  SKIN: No itching, burning, rashes, or lesions   LYMPH Nodes: No enlarged glands  ENDOCRINE: No heat or cold intolerance; No hair loss  MUSCULOSKELETAL: No joint pain or swelling; No muscle, back, or extremity pain  PSYCHIATRIC: No depression, anxiety, mood swings, or difficulty sleeping  HEME/LYMPH: No easy bruising, or bleeding gums  ALLERY AND IMMUNOLOGIC: No hives or eczema	      PHYSICAL EXAM:  Vital Signs Last 24 Hrs  T(C): 36.4 (13 Sep 2019 05:31), Max: 36.6 (12 Sep 2019 22:01)  T(F): 97.5 (13 Sep 2019 05:31), Max: 97.9 (12 Sep 2019 22:01)  HR: 68 (13 Sep 2019 05:32) (64 - 74)  BP: 100/64 (13 Sep 2019 05:32) (96/62 - 117/74)  BP(mean): 79 (13 Sep 2019 05:32) (66 - 88)  RR: 28 (13 Sep 2019 05:32) (17 - 30)  SpO2: 93% (13 Sep 2019 05:32) (93% - 99%)  Height in cm: 165.1 (12 Sep 2019 19:18)    Daily Weight in k.3 (13 Sep 2019 05:31)    Constitutional: NAD	  HEENT:   Normal oral mucosa, PERRL, EOMI	  Neck: No JVD  CVS: Normal S1 / S2, RRR, No murmurs  Pulm: CTA. No wheeze or rale  GI:  + BS, soft, NT / ND   Ext: No LE edema  Vascular: Peripheral pulses palpable 2+ bilaterally  MS: full range of motion in all joints  Neurologic: A&O x 3, Non-focal  Psych: Pleasant, has good insight  Skin: No rash or lesion       	  LABS:	                         12.1   9.40  )-----------( 224      ( 13 Sep 2019 06:52 )             37.6         138  |  104  |  23  ----------------------------<  107<H>  3.6   |  24  |  0.95    Ca    8.9      13 Sep 2019 06:52  Phos  3.6       Mg     1.6         TPro  5.8<L>  /  Alb  2.6<L>  /  TBili  1.4<H>  /  DBili  x   /  AST  15  /  ALT  7<L>  /  AlkPhos  82      proBNP: Serum Pro-Brain Natriuretic Peptide: 06664 pg/mL ( @ 14:11)    Lipid Profile:   HgA1c:   TSH: 	      EKG: afib rate 71, LBBB, widened QRS 162ms  Telemetry: afib rate 60s-70s, LBBB  TRINA 2017, no clot, enlarged LA size, moderate mitral regurgitation LVEF 50%. Electrophysiology Consult Note:     CHIEF COMPLAINT:  Patient is a 85y old Female who presents with a chief complaint of CHF, low EF (13 Sep 2019 08:29)        HISTORY OF PRESENT ILLNESS:   85 year old female (poor historian) w/ pmh of atrial fibrillation s/p failed DCCV 17 (on Eliquis), multiple CVAs (most recent 2017), HTN, Hyperlipidemia, and memory deficits (uncertain dementia) admitted from Dr. Rajput's office after presenting with dyspnea on exertion x 1 week, found to have EF of 20% and moderate mitral regurgitation on bedside echocardiogram. EKG in office w/ widened LBBB. Patient is accompanied by friend, who is able to partially fill in gaps related to HPI. Patient indicates was extremely active and able to complete ADL alone without symptoms, however over past has had increased SOB and been unable to walk 10 feet without becoming SOB. Friend indicates patient has had difficulty maneuvering around apartment and holds on to wall for support, with increased breathing. Patient denies sick contacts or recent travel. Patient currently denies CP, SOB, lower extremity edema,  n/v/changes in bowel habits.   EP consulted for evaluation for BiV PPM given widened QRS and EF 20%.         PAST MEDICAL & SURGICAL HISTORY:  Osteoporosis s/p wrist fracture   Hyperlipidemia   Hypertension  Afib (on Eliquis)  memory deficit (uncertain dementia)  multiple CVAs (most recent 2017)  History of facelift      FAMILY HISTORY:  No pertinent family history in first degree relatives      SOCIAL HISTORY:    [ ] Non-smoker  [ ] Smoker  [ ] Alcohol  [ ] illicit drugs    Allergies    No Known Allergies    Intolerances    	    MEDICATIONS:  MEDICATIONS  (STANDING):  apixaban 2.5 milliGRAM(s) Oral two times a day  atorvastatin 10 milliGRAM(s) Oral at bedtime  furosemide   Injectable 20 milliGRAM(s) IV Push once  lisinopril 2.5 milliGRAM(s) Oral daily  metoprolol succinate ER 50 milliGRAM(s) Oral daily  sertraline 50 milliGRAM(s) Oral daily    MEDICATIONS  (PRN):        REVIEW OF SYSTEMS:  CONSTITUTIONAL: No fever, weight loss, or fatigue  EYES: No eye pain, visual disturbances, or discharge  ENMT:  No difficulty hearing, tinnitus, vertigo; No sinus or throat pain  NECK: No pain or stiffness  BREASTS: No pain, masses, or nipple discharge  RESPIRATORY: No cough, wheezing, chills or hemoptysis; No Shortness of Breath  CARDIOVASCULAR: No chest pain, palpitations, dizziness, or leg swelling  GASTROINTESTINAL: No abdominal or epigastric pain. No nausea, vomiting, or hematemesis; No diarrhea or constipation. No melena or hematochezia.  GENITOURINARY: No dysuria, frequency, hematuria, or incontinence  NEUROLOGICAL: No headaches, memory loss, loss of strength, numbness, or tremors  SKIN: No itching, burning, rashes, or lesions   LYMPH Nodes: No enlarged glands  ENDOCRINE: No heat or cold intolerance; No hair loss  MUSCULOSKELETAL: No joint pain or swelling; No muscle, back, or extremity pain  PSYCHIATRIC: No depression, anxiety, mood swings, or difficulty sleeping  HEME/LYMPH: No easy bruising, or bleeding gums  ALLERY AND IMMUNOLOGIC: No hives or eczema	      PHYSICAL EXAM:  Vital Signs Last 24 Hrs  T(C): 36.4 (13 Sep 2019 05:31), Max: 36.6 (12 Sep 2019 22:01)  T(F): 97.5 (13 Sep 2019 05:31), Max: 97.9 (12 Sep 2019 22:01)  HR: 68 (13 Sep 2019 05:32) (64 - 74)  BP: 100/64 (13 Sep 2019 05:32) (96/62 - 117/74)  BP(mean): 79 (13 Sep 2019 05:32) (66 - 88)  RR: 28 (13 Sep 2019 05:32) (17 - 30)  SpO2: 93% (13 Sep 2019 05:32) (93% - 99%)  Height in cm: 165.1 (12 Sep 2019 19:18)    Daily Weight in k.3 (13 Sep 2019 05:31)    Constitutional: NAD	  HEENT:   Normal oral mucosa, PERRL, EOMI	  Neck: No JVD  CVS: Normal S1 / S2, RRR, No murmurs  Pulm: CTA. No wheeze or rale  GI:  + BS, soft, NT / ND   Ext: No LE edema  Vascular: Peripheral pulses palpable 2+ bilaterally  MS: full range of motion in all joints  Neurologic: A&O x 3, Non-focal  Psych: Pleasant, has good insight  Skin: No rash or lesion       	  LABS:	                         12.1   9.40  )-----------( 224      ( 13 Sep 2019 06:52 )             37.6         138  |  104  |  23  ----------------------------<  107<H>  3.6   |  24  |  0.95    Ca    8.9      13 Sep 2019 06:52  Phos  3.6       Mg     1.6         TPro  5.8<L>  /  Alb  2.6<L>  /  TBili  1.4<H>  /  DBili  x   /  AST  15  /  ALT  7<L>  /  AlkPhos  82      proBNP: Serum Pro-Brain Natriuretic Peptide: 81617 pg/mL ( @ 14:11)    Lipid Profile:   HgA1c:   TSH: 	      EKG: afib rate 71, LBBB, widened QRS 162ms  Telemetry: afib rate 60s-70s, LBBB  TRINA 2017, no clot, enlarged LA size, moderate mitral regurgitation LVEF 50%.  TTE 19: Left Ventricle:The left ventricle is mildly dilated. Left ventricular systolic function   is moderately-to-severely reduced with a calculated ejection fraction of   35%. Septal motion is abnormal consistent with left bundle branch block.   There is akinesis of the basal and mid inferior wall. There is normal   wall thickness.Analysis of mitral annular tissue Doppler, left atrial volume index, and   tricuspid regurgitant velocity reveals: grade III diastolic dysfunction   with elevated filling pressure.Right Ventricle:The right ventricle is normal in size. Right ventricular systolic   function is reduced. The tricuspid annular plane systolic excursion (TAPSE) is 14 mm (normal 17mm or higher). RV tissue Doppler S' is 7 cm/s (normal >10cm/s).Left Atrium: The left atrium is severely dilated. JOSUÉ 91.95ml/m2. Right Atrium:The right atrium is mildly dilated. RUSSEL 35.2ml/m2. Aortic Valve:Fibrocalcific tricuspid aortic valve without significant stenosis. There   is mild valvular aortic regurgitation.Pulmonic Valve:Structurally normal pulmonic valve with normal leaflet excursion. There is trace pulmonic regurgitation.Mitral Valve:Structurally normal mitral valve with normal leaflet excursion. There is moderate-to-severe mitral regurgitation. The Estimated Regurgitant Orifice Area (EROA) via the PISA method is 0.20 cm² (severe >0.4cm2). Tricuspid Valve:  Structurally normal tricuspid valve with normal leaflet excursion. There is moderate-to-severe tricuspid regurgitation. Pulmonary artery systolic pressure (estimated using the tricuspid regurgitant gradient and an estimate of right atrial pressure) is 54.6 mmHg.  Aorta:The aortic root is normal in size and structure.Venous:The inferior vena cava is normal in size (<2.1cm) with normal inspiratory collapse (>50%) consistent with normal right atrial pressure (3, range 0-5mmHg).Pericardium:The pericardium is normal in appearance and thickness without significant pericardial effusion. Electrophysiology Consult Note:     CHIEF COMPLAINT:  Patient is a 85y old Female who presents with a chief complaint of GARCIA x 2 1/2  weeks and leg swelling x 1 week with low EF 20%.        HISTORY OF PRESENT ILLNESS:   85 year old female (poor historian) w/ pmh of atrial fibrillation s/p failed DCCV 17 (on Eliquis), multiple CVAs (most recent 2017), HTN, Hyperlipidemia, and memory deficits (uncertain dementia) admitted from Dr. Rajput's office after presenting with dyspnea on exertion x 2 1/2 weeks and leg swelling x 1 week, found to have EF of 20% and moderate mitral regurgitation on bedside echocardiogram. EKG in office w/ widened LBBB. Patient is accompanied by friend, who is able to partially fill in gaps related to HPI. Patient indicates was extremely active and able to complete ADL alone without symptoms, however over past has had increased SOB and been unable to walk 10 feet without becoming SOB. Friend indicates patient has had difficulty maneuvering around apartment and holds on to wall for support, with increased breathing. Patient denies sick contacts or recent travel. Patient currently denies CP, SOB, lower extremity edema,  n/v/changes in bowel habits.     EP consulted for evaluation for BiV PPM given widened QRS and EF 20%. Pt reports having GARCIA x 2 1/2 weeks and leg swelling x 1 week. While walking up an incline ~10 feet, pt noted shortness of breath. However, symptoms improved after rest. States that she is active, but noticed a decline recently. Denies chest pain, palpitations, dizziness, PND, orthopnea or syncope. Reports that she is aware that "my heart is enlarged and weak", but unsure why she is hospitalized.         PAST MEDICAL & SURGICAL HISTORY:  Osteoporosis s/p wrist fracture   Hyperlipidemia   Hypertension  Afib (on Eliquis)  memory deficit (uncertain dementia)  multiple CVAs (most recent 2017)  History of facelift      FAMILY HISTORY:  No pertinent family history in first degree relatives      SOCIAL HISTORY:    Denies smoking history   Reports alcohol use (2 drinks/night)  Denies recreational drug use     Allergies    No Known Allergies    Intolerances    	      MEDICATIONS  (STANDING):  apixaban 2.5 milliGRAM(s) Oral two times a day  atorvastatin 10 milliGRAM(s) Oral at bedtime  furosemide   Injectable 20 milliGRAM(s) IV Push once  lisinopril 2.5 milliGRAM(s) Oral daily  metoprolol succinate ER 50 milliGRAM(s) Oral daily  sertraline 50 milliGRAM(s) Oral daily    MEDICATIONS  (PRN):        REVIEW OF SYSTEMS:  CONSTITUTIONAL: No fever, weight loss, or fatigue  EYES: No eye pain, visual disturbances, or discharge  ENMT:  No difficulty hearing, tinnitus, vertigo; No sinus or throat pain  NECK: No pain or stiffness  BREASTS: No pain, masses, or nipple discharge  RESPIRATORY: No cough, wheezing, chills or hemoptysis; No Shortness of Breath  CARDIOVASCULAR: No chest pain, palpitations, dizziness, or leg swelling  GASTROINTESTINAL: No abdominal or epigastric pain. No nausea, vomiting, or hematemesis; No diarrhea or constipation. No melena or hematochezia.  GENITOURINARY: No dysuria, frequency, hematuria, or incontinence  NEUROLOGICAL: No headaches, memory loss, loss of strength, numbness, or tremors  SKIN: No itching, burning, rashes, or lesions   LYMPH Nodes: No enlarged glands  ENDOCRINE: No heat or cold intolerance; No hair loss  MUSCULOSKELETAL: No joint pain or swelling; No muscle, back, or extremity pain  PSYCHIATRIC: No depression, anxiety, mood swings, or difficulty sleeping  HEME/LYMPH: No easy bruising, or bleeding gums  ALLERY AND IMMUNOLOGIC: No hives or eczema	      PHYSICAL EXAM:  Vital Signs Last 24 Hrs  T(C): 36.4 (13 Sep 2019 05:31), Max: 36.6 (12 Sep 2019 22:01)  T(F): 97.5 (13 Sep 2019 05:31), Max: 97.9 (12 Sep 2019 22:01)  HR: 68 (13 Sep 2019 05:32) (64 - 74)  BP: 100/64 (13 Sep 2019 05:32) (96/62 - 117/74)  BP(mean): 79 (13 Sep 2019 05:32) (66 - 88)  RR: 28 (13 Sep 2019 05:32) (17 - 30)  SpO2: 93% (13 Sep 2019 05:32) (93% - 99%)  Height in cm: 165.1 (12 Sep 2019 19:18)    Daily Weight in k.3 (13 Sep 2019 05:31)    Constitutional: NAD	  HEENT:   Normal oral mucosa, PERRL, EOMI	  Neck: + JVD   CVS: Normal S1 / S2, RRR, BREANNE 3/6  Pulm: fine rales noted bibasilar   GI:  + BS, soft, NT / ND   Ext: 2+ pitting edema   Vascular: Peripheral pulses palpable 2+ bilaterally  MS: full range of motion in all joints  Neurologic: A&O x 3, Non-focal  Psych: Pleasant, has good insight  Skin: No rash or lesion       	  LABS:	                         12.1   9.40  )-----------( 224      ( 13 Sep 2019 06:52 )             37.6         138  |  104  |  23  ----------------------------<  107<H>  3.6   |  24  |  0.95    Ca    8.9      13 Sep 2019 06:52  Phos  3.6       Mg     1.6         TPro  5.8<L>  /  Alb  2.6<L>  /  TBili  1.4<H>  /  DBili  x   /  AST  15  /  ALT  7<L>  /  AlkPhos  82      proBNP: Serum Pro-Brain Natriuretic Peptide: 71469 pg/mL ( @ 14:11)    Lipid Profile:   HgA1c:   TSH: 	      EKG: afib rate 71, LBBB, widened QRS 162ms  Telemetry: afib rate 60s-70s, LBBB  TRINA 2017, no clot, enlarged LA size, moderate mitral regurgitation LVEF 50%.  TTE 19: Left Ventricle: the left ventricle is mildly dilated. Left ventricular systolic function   is moderately-to-severely reduced with a calculated ejection fraction of 35%. Septal motion is abnormal consistent with left bundle branch block. There is akinesis of the basal and mid inferior wall. There is normal wall thickness.Analysis of mitral annular tissue Doppler, left atrial volume index, and tricuspid regurgitant velocity reveals: grade III diastolic dysfunction   with elevated filling pressure. Right Ventricle:The right ventricle is normal in size. Right ventricular systolic   function is reduced. The tricuspid annular plane systolic excursion (TAPSE) is 14 mm (normal 17mm or higher). RV tissue Doppler S' is 7 cm/s (normal >10cm/s).Left Atrium: The left atrium is severely dilated. JOSUÉ 91.95ml/m2. Right Atrium:The right atrium is mildly dilated. RUSSEL 35.2ml/m2. Aortic Valve:Fibrocalcific tricuspid aortic valve without significant stenosis. There   is mild valvular aortic regurgitation.Pulmonic Valve:Structurally normal pulmonic valve with normal leaflet excursion. There is trace pulmonic regurgitation.Mitral Valve:Structurally normal mitral valve with normal leaflet excursion. There is moderate-to-severe mitral regurgitation. The Estimated Regurgitant Orifice Area (EROA) via the PISA method is 0.20 cm² (severe >0.4cm2). Tricuspid Valve:  Structurally normal tricuspid valve with normal leaflet excursion. There is moderate-to-severe tricuspid regurgitation. Pulmonary artery systolic pressure (estimated using the tricuspid regurgitant gradient and an estimate of right atrial pressure) is 54.6 mmHg.  Aorta:The aortic root is normal in size and structure.Venous:The inferior vena cava is normal in size (<2.1cm) with normal inspiratory collapse (>50%) consistent with normal right atrial pressure (3, range 0-5mmHg).Pericardium:The pericardium is normal in appearance and thickness without significant pericardial effusion.

## 2019-09-13 NOTE — CONSULT NOTE ADULT - SUBJECTIVE AND OBJECTIVE BOX
HPI:  83 year old female (poor historian) with medical history of afibb (on eliquis), multiple CVAs, HTN, HLD, and memory deficits (uncertain dementia) admitted from Dr. Rajput's office after presenting with GARCIA of one week's duration, found to have EF of 20% and moderate mitral regurgitation on bedside echocardiogram. EKG in office indicative of BiV pacing with LBBB that appears widened. Patient is accompanied by friend, who is able to partially fill in gaps related to HPI. Patient indicates was extremely active and able to complete ADL alone without symptoms, however over past has had increased SOB and been unable to walk 10 feet without becoming SOB. Friend indicates patient has had difficulty maneuvering around apartment and holds on to wall for support, with increased breathing. Patient denies sick contacts or recent travel. Patient currently denies CP, SOB, lower extremity edema,  n/v/changes in bowel habits. (12 Sep 2019 16:24)      ROS: A 10-point review of systems was otherwise negative.    PAST MEDICAL & SURGICAL HISTORY:  Osteoporosis  Hypercholesteremia  Hypertension  CHF (congestive heart failure)  Afib  History of facelift      SOCIAL HISTORY:  FAMILY HISTORY:  No pertinent family history in first degree relatives      ALLERGIES: 	  No Known Allergies            MEDICATIONS:  apixaban 2.5 milliGRAM(s) Oral two times a day  atorvastatin 10 milliGRAM(s) Oral at bedtime  furosemide   Injectable 20 milliGRAM(s) IV Push once  lisinopril 2.5 milliGRAM(s) Oral daily  metoprolol succinate ER 50 milliGRAM(s) Oral daily  sertraline 50 milliGRAM(s) Oral daily      PHYSICAL EXAM:  T(C): 36.4 (09-13-19 @ 05:31), Max: 36.6 (09-12-19 @ 22:01)  HR: 68 (09-13-19 @ 05:32) (64 - 74)  BP: 100/64 (09-13-19 @ 05:32) (96/62 - 117/74)  RR: 28 (09-13-19 @ 05:32) (17 - 30)  SpO2: 93% (09-13-19 @ 05:32) (93% - 99%)  Wt(kg): --    09-12-19 @ 07:01  -  09-13-19 @ 07:00  --------------------------------------------------------  IN: 0 mL / OUT: 300 mL / NET: -300 mL        GEN: Awake, comfortable. NAD.   HEENT: NCAT, PERRL, EOMI. Mucosa moist. No JVD.   RESP: CTA b/l  CV: RRR, normal s1/s2. No m/r/g.  ABD: Soft, NTND. BS+  EXT: Warm. No edema, clubbing, or cyanosis.   NEURO: AAOx3. No focal deficits.    I&O's Summary    12 Sep 2019 07:01  -  13 Sep 2019 07:00  --------------------------------------------------------  IN: 0 mL / OUT: 300 mL / NET: -300 mL      Height (cm): 165.1 (09-12 @ 19:18)  Weight (kg): 62.596 (09-12 @ 19:18)  BMI (kg/m2): 23 (09-12 @ 19:18)  BSA (m2): 1.69 (09-12 @ 19:18)  	  LABS:	 	    CARDIAC MARKERS:                                  12.1   9.40  )-----------( 224      ( 13 Sep 2019 06:52 )             37.6     09-13    138  |  104  |  23  ----------------------------<  107<H>  3.6   |  24  |  0.95    Ca    8.9      13 Sep 2019 06:52  Phos  3.6     09-13  Mg     1.6     09-13    TPro  5.8<L>  /  Alb  2.6<L>  /  TBili  1.4<H>  /  DBili  x   /  AST  15  /  ALT  7<L>  /  AlkPhos  82  09-12    proBNP: Serum Pro-Brain Natriuretic Peptide: 69462 pg/mL (09-12 @ 14:11)    Lipid Profile:   HgA1c:   TSH:     TELEMETRY: 	    ECG:  	  RADIOLOGY:   ECHO:  STRESS:  CATH:

## 2019-09-13 NOTE — PROGRESS NOTE ADULT - PROBLEM SELECTOR PLAN 1
Hx of CHF presenting from PCP office with bedside echo finding of 20%, from previous 50%, with increased GARCIA, difficulty walking, likely due to severe LE swelling and limited ADLs. Patient s/p 20mg IV lasix push in ED. Patient mildly fluid overlaoded with bilateral lower extremity edema on admission.   - This AM Patient complaining of SOB this AM to attending, denies upon exam. Fluid overloaded with 2+ pitting edema bilaterally, Saturating well on room air. IV lasix 20mg push.  -c/w IV lasix 20mg push PRN, per assessment of patient  -CXR AM 9/13 consistent with admission xray, both indicating cardiomegaly, thoracic aortic calcification and tortuosity. Bibasilar opacities/pleural effusions. Stable bony structures.  -Trops negativex2  -EP consulted for possible pacemaker placement in setting of widened LBBB and low EF f/u reccs  -HF team consulted f.u reccs Hx of CHF presenting from PCP office with bedside echo finding of 20%, from previous 50%, with increased GARCIA, difficulty walking, likely due to severe LE swelling and limited ADLs. Patient s/p 20mg IV lasix push in ED. Patient mildly fluid overlaoded with bilateral lower extremity edema on admission.   - This AM Patient complaining of SOB this AM to attending, denies upon exam. Fluid overloaded with 2+ pitting edema bilaterally, Saturating well on room air. IV lasix 20mg push.  -c/w IV lasix 20mg push PRN, per assessment of patient  -CXR AM 9/13 consistent with admission xray, both indicating cardiomegaly, thoracic aortic calcification and tortuosity. Bibasilar opacities/pleural effusions. Stable bony structures.  -Trops negativex2  -EP consulted for possible pacemaker placement in setting of widened LBBB and low EF f/u reccs  -HF team recommendation of ischemic workup: Lexiscan scheduled 9/16  -NPO prior to lexiscan 9/15  -Daily EKGs  -f/u EP reccs

## 2019-09-13 NOTE — PROGRESS NOTE ADULT - PROBLEM SELECTOR PLAN 5
Lapses in memory noted on exam, with confusion and possible memory deficits. Unclear baseline. Possibly attributed to hx of CVAs.   -consider CTH in AM Lapses in memory noted on exam, with confusion and possible memory deficits. Unclear baseline. Possibly attributed to hx of CVAs.   -CTH 9/13- Several sites of old cortical infarction in both cerebral hemispheres. No   acute intracranial hemorrhage, mass effect or CT evidence of recent   infarction.

## 2019-09-13 NOTE — PHYSICAL THERAPY INITIAL EVALUATION ADULT - PERTINENT HX OF CURRENT PROBLEM, REHAB EVAL
83 year old female (poor historian) with medical history of afibb (on eliquis), multiple CVAs, HTN, HLD, and memory deficits (uncertain dementia) admitted from Dr. Rajput's office after presenting with GARCIA of one week's duration, found to have EF of 20% and moderate mitral regurgitation on bedside echocardiogram.

## 2019-09-13 NOTE — PROGRESS NOTE ADULT - SUBJECTIVE AND OBJECTIVE BOX
INTERVAL HISTORY:  SOB persists  	  MEDICATIONS:  furosemide   Injectable 20 milliGRAM(s) IV Push once  lisinopril 2.5 milliGRAM(s) Oral daily  metoprolol succinate ER 50 milliGRAM(s) Oral daily        sertraline 50 milliGRAM(s) Oral daily      atorvastatin 10 milliGRAM(s) Oral at bedtime    apixaban 2.5 milliGRAM(s) Oral two times a day        PHYSICAL EXAM:  T(C): 36.4 (09-13-19 @ 05:31), Max: 36.6 (09-12-19 @ 22:01)  HR: 68 (09-13-19 @ 05:32) (64 - 74)  BP: 100/64 (09-13-19 @ 05:32) (96/62 - 117/74)  RR: 28 (09-13-19 @ 05:32) (17 - 30)  SpO2: 93% (09-13-19 @ 05:32) (93% - 99%)  Wt(kg): --  I&O's Summary    12 Sep 2019 07:01  -  13 Sep 2019 07:00  --------------------------------------------------------  IN: 0 mL / OUT: 300 mL / NET: -300 mL      Height (cm): 165.1 (09-12 @ 19:18)  Weight (kg): 62.596 (09-12 @ 19:18)  BMI (kg/m2): 23 (09-12 @ 19:18)  BSA (m2): 1.69 (09-12 @ 19:18)    Appearance: Normal	  HEENT:   Normal oral mucosa, PERRL, EOMI	  Lymphatic: No lymphadenopathy  Cardiovascular: Normal S1 S2, No JVD, MR murmur, trace edema  Respiratory: Lungs clear to auscultation	  Psychiatry: A & O x 3, Mood & affect appropriate  Gastrointestinal:  Soft, Non-tender, + BS	  Skin: No rashes, No ecchymoses, No cyanosis  Extremities: Normal range of motion, No clubbing, cyanosis, trace edema  Vascular: Peripheral pulses palpable 2+ bilaterally    TELEMETRY: 	  AF  ECG:  	  RADIOLOGY:   DIAGNOSTIC TESTING:  [ ] Echocardiogram:  [ ]  Catheterization:  [ ] Stress Test:    OTHER: 	    LABS:	 	    CARDIAC MARKERS:                                  12.1   9.40  )-----------( 224      ( 13 Sep 2019 06:52 )             37.6     09-13    138  |  104  |  23  ----------------------------<  107<H>  3.6   |  24  |  0.95    Ca    8.9      13 Sep 2019 06:52  Phos  3.6     09-13  Mg     1.6     09-13    TPro  5.8<L>  /  Alb  2.6<L>  /  TBili  1.4<H>  /  DBili  x   /  AST  15  /  ALT  7<L>  /  AlkPhos  82  09-12    proBNP: Serum Pro-Brain Natriuretic Peptide: 46457 pg/mL (09-12 @ 14:11)    Lipid Profile:   HgA1c:   TSH:     ASSESSMENT/PLAN:

## 2019-09-13 NOTE — CONSULT NOTE ADULT - PROBLEM SELECTOR RECOMMENDATION 9
EKG on admission: LBBB w/ QRS 162ms. Bedside TTE EF 20%, etiology unknown.   -daily EKGs  -Will need ischemic workup prior to BiV PPM implantation EKG on admission: LBBB w/ QRS 162ms. Bedside TTE EF 20%, etiology unknown.  TTE inpatient: EF 35%; akinesis of the basal and mid inferior wall; mod to severe MR/TR. PASP 54mmHg.   -daily EKGs  -Will need ischemic workup prior to BiV PPM implantation  -Will contact pt's HCP, Tashi, to discuss PPM implant as pt has periods of confusion EKG on admission: LBBB w/ QRS 162ms. Bedside TTE EF 20%, etiology unknown.  TTE inpatient: EF 35%; akinesis of the basal and mid inferior wall; mod to severe MR/TR. PASP 54mmHg.   -daily EKGs  -Will need ischemic workup prior  -Will recommend BiV PPM and consider AV tram ablation. Will await NST results to determine plan.

## 2019-09-13 NOTE — CONSULT NOTE ADULT - PROBLEM SELECTOR RECOMMENDATION 2
Acute systolic CHF exacerbation. Admitted from Dr. Rajput's office with complaints of GARCIA x 1 week. Bedside TTE with EF 20% w/ mod MR (prior TRINA 2017 EF 50%). EKG w/ LBBB and widened QRS.   -continue CHF medication regimen and diuresis   -D/w team to see if pt had a prior ischemic workup. Will need to determine etiology of reduced EF and if further workup is indicated.   -Once ischemic workup is completed, will schedule pt for BiV PPM Acute systolic CHF exacerbation. Admitted from Dr. Rajput's office with complaints of GARCIA x 1 week. Bedside TTE with EF 20% w/ mod MR (prior TRINA 2017 EF 50%). EKG w/ LBBB and widened QRS. Pt is volume overloaded on exam. BNP on admission: 29,000  -continue CHF medication regimen and diuresis   -D/w team and plan for NST today to evaluate WMAs on TTE.   -Once ischemic workup is completed, recommendation for BiV PPM to improve cardiac function. Acute systolic CHF exacerbation. Admitted from Dr. Rajput's office with complaints of GARCIA and leg swelling. Bedside TTE with EF 20% w/ mod MR (prior TRINA 2017 EF 50%). EKG w/ LBBB w/ QRS 162ms. Pt is volume overloaded on exam. BNP on admission: 29,000  -continue CHF medication regimen and diuresis   -D/w team and plan for NST today to evaluate WMAs on TTE.   -Once ischemic workup is completed, recommendation for BiV PPM to improve cardiac function. Acute systolic CHF exacerbation. Admitted from Dr. Rajput's office with complaints of GARCIA and leg swelling. Bedside TTE with EF 20% w/ mod MR (prior TRINA 2017 EF 50%). EKG w/ LBBB w/ QRS 162ms. Pt is volume overloaded on exam. BNP on admission: 29,000  -continue CHF medication regimen and diuresis   -D/w team and plan for NST today to evaluate WMAs on TTE.   -Once ischemic workup is completed, recommendation for BiV PPM and consider AV tram ablation.

## 2019-09-14 ENCOUNTER — TRANSCRIPTION ENCOUNTER (OUTPATIENT)
Age: 84
End: 2019-09-14

## 2019-09-14 LAB
ANION GAP SERPL CALC-SCNC: 10 MMOL/L — SIGNIFICANT CHANGE UP (ref 5–17)
BUN SERPL-MCNC: 20 MG/DL — SIGNIFICANT CHANGE UP (ref 7–23)
CALCIUM SERPL-MCNC: 8.7 MG/DL — SIGNIFICANT CHANGE UP (ref 8.4–10.5)
CHLORIDE SERPL-SCNC: 104 MMOL/L — SIGNIFICANT CHANGE UP (ref 96–108)
CO2 SERPL-SCNC: 23 MMOL/L — SIGNIFICANT CHANGE UP (ref 22–31)
CREAT SERPL-MCNC: 0.73 MG/DL — SIGNIFICANT CHANGE UP (ref 0.5–1.3)
GLUCOSE SERPL-MCNC: 90 MG/DL — SIGNIFICANT CHANGE UP (ref 70–99)
HCT VFR BLD CALC: 36.6 % — SIGNIFICANT CHANGE UP (ref 34.5–45)
HGB BLD-MCNC: 11.9 G/DL — SIGNIFICANT CHANGE UP (ref 11.5–15.5)
MAGNESIUM SERPL-MCNC: 1.6 MG/DL — SIGNIFICANT CHANGE UP (ref 1.6–2.6)
MCHC RBC-ENTMCNC: 31.1 PG — SIGNIFICANT CHANGE UP (ref 27–34)
MCHC RBC-ENTMCNC: 32.5 GM/DL — SIGNIFICANT CHANGE UP (ref 32–36)
MCV RBC AUTO: 95.6 FL — SIGNIFICANT CHANGE UP (ref 80–100)
NRBC # BLD: 0 /100 WBCS — SIGNIFICANT CHANGE UP (ref 0–0)
PLATELET # BLD AUTO: 205 K/UL — SIGNIFICANT CHANGE UP (ref 150–400)
POTASSIUM SERPL-MCNC: 3.9 MMOL/L — SIGNIFICANT CHANGE UP (ref 3.5–5.3)
POTASSIUM SERPL-SCNC: 3.9 MMOL/L — SIGNIFICANT CHANGE UP (ref 3.5–5.3)
RBC # BLD: 3.83 M/UL — SIGNIFICANT CHANGE UP (ref 3.8–5.2)
RBC # FLD: 15.4 % — HIGH (ref 10.3–14.5)
SODIUM SERPL-SCNC: 137 MMOL/L — SIGNIFICANT CHANGE UP (ref 135–145)
WBC # BLD: 7.14 K/UL — SIGNIFICANT CHANGE UP (ref 3.8–10.5)
WBC # FLD AUTO: 7.14 K/UL — SIGNIFICANT CHANGE UP (ref 3.8–10.5)

## 2019-09-14 PROCEDURE — 99232 SBSQ HOSP IP/OBS MODERATE 35: CPT

## 2019-09-14 PROCEDURE — 93010 ELECTROCARDIOGRAM REPORT: CPT

## 2019-09-14 PROCEDURE — 71045 X-RAY EXAM CHEST 1 VIEW: CPT | Mod: 26

## 2019-09-14 RX ORDER — FUROSEMIDE 40 MG
20 TABLET ORAL ONCE
Refills: 0 | Status: COMPLETED | OUTPATIENT
Start: 2019-09-14 | End: 2019-09-14

## 2019-09-14 RX ADMIN — Medication 50 MILLIGRAM(S): at 11:36

## 2019-09-14 RX ADMIN — LISINOPRIL 2.5 MILLIGRAM(S): 2.5 TABLET ORAL at 06:35

## 2019-09-14 RX ADMIN — ATORVASTATIN CALCIUM 10 MILLIGRAM(S): 80 TABLET, FILM COATED ORAL at 21:09

## 2019-09-14 RX ADMIN — APIXABAN 5 MILLIGRAM(S): 2.5 TABLET, FILM COATED ORAL at 17:14

## 2019-09-14 RX ADMIN — SERTRALINE 50 MILLIGRAM(S): 25 TABLET, FILM COATED ORAL at 11:36

## 2019-09-14 RX ADMIN — APIXABAN 5 MILLIGRAM(S): 2.5 TABLET, FILM COATED ORAL at 06:34

## 2019-09-14 RX ADMIN — Medication 20 MILLIGRAM(S): at 11:38

## 2019-09-14 NOTE — PROGRESS NOTE ADULT - PROBLEM SELECTOR PLAN 4
History of CVA (most recent March 2017), possibly due to afibb  -c/w with eliquis 5mg  -will not require lovenox

## 2019-09-14 NOTE — PROGRESS NOTE ADULT - SUBJECTIVE AND OBJECTIVE BOX
**Incomplete Note** **Incomplete Note**    OVERNIGHT EVENTS:    SUBJECTIVE / INTERVAL HPI: Patient seen and examined at bedside.     VITAL SIGNS:  Vital Signs Last 24 Hrs  T(C): 36.3 (14 Sep 2019 05:06), Max: 36.6 (13 Sep 2019 18:06)  T(F): 97.3 (14 Sep 2019 05:06), Max: 97.9 (13 Sep 2019 18:06)  HR: 69 (14 Sep 2019 08:27) (62 - 82)  BP: 96/65 (14 Sep 2019 08:27) (88/57 - 112/65)  BP(mean): 81 (14 Sep 2019 08:27) (62 - 91)  RR: 20 (14 Sep 2019 08:27) (16 - 38)  SpO2: 93% (14 Sep 2019 08:27) (93% - 98%)    PHYSICAL EXAM:    General: WDWN  HEENT: NC/AT; PERRL, anicteric sclera; MMM  Neck: supple  Cardiovascular: +S1/S2; RRR  Respiratory: CTA B/L; no W/R/R  Gastrointestinal: soft, NT/ND; +BSx4  Extremities: WWP; no edema, clubbing or cyanosis  Vascular: 2+ radial, DP/PT pulses B/L  Neurological: AAOx3; no focal deficits    MEDICATIONS:  MEDICATIONS  (STANDING):  apixaban 5 milliGRAM(s) Oral every 12 hours  atorvastatin 10 milliGRAM(s) Oral at bedtime  furosemide   Injectable 20 milliGRAM(s) IV Push once  lisinopril 2.5 milliGRAM(s) Oral daily  metoprolol succinate ER 50 milliGRAM(s) Oral daily  sertraline 50 milliGRAM(s) Oral daily    MEDICATIONS  (PRN):      ALLERGIES:  Allergies    No Known Allergies    Intolerances        LABS:                        11.9   7.14  )-----------( 205      ( 14 Sep 2019 05:56 )             36.6     09-14    137  |  104  |  20  ----------------------------<  90  3.9   |  23  |  0.73    Ca    8.7      14 Sep 2019 05:56  Phos  3.6     09-13  Mg     1.6     09-14    TPro  5.8<L>  /  Alb  2.6<L>  /  TBili  1.4<H>  /  DBili  x   /  AST  15  /  ALT  7<L>  /  AlkPhos  82  09-12    PT/INR - ( 12 Sep 2019 14:11 )   PT: 15.6 sec;   INR: 1.37          PTT - ( 12 Sep 2019 14:11 )  PTT:29.8 sec    CAPILLARY BLOOD GLUCOSE          RADIOLOGY & ADDITIONAL TESTS: Reviewed.    ASSESSMENT:    PLAN: OVERNIGHT EVENTS: No acute events overnight, HD stable. UOP net negative -540 (I: 560; O:1100).     SUBJECTIVE / INTERVAL HPI: Patient seen and examined at bedside this AM. Patient indicates she is feeling much better compared to presentation. Patient indicates she feels her feet feel lighter, but has not attempted to walk. Patient denies dizziness, palpations, chest pain, SOB, nausea, or vomiting. No complaints at this time.      VITAL SIGNS:  Vital Signs Last 24 Hrs  T(C): 36.3 (14 Sep 2019 05:06), Max: 36.6 (13 Sep 2019 18:06)  T(F): 97.3 (14 Sep 2019 05:06), Max: 97.9 (13 Sep 2019 18:06)  HR: 69 (14 Sep 2019 08:27) (62 - 82)  BP: 96/65 (14 Sep 2019 08:27) (88/57 - 112/65)  BP(mean): 81 (14 Sep 2019 08:27) (62 - 91)  RR: 20 (14 Sep 2019 08:27) (16 - 38)  SpO2: 93% (14 Sep 2019 08:27) (93% - 98%)    PHYSICAL EXAM:  General: frail, extremely pleasant, appearing elderly female, resting in bed, eating breakfast  HEENT: NC/AT; PERRL, anicteric sclera; MMM  Neck: supple, no JVD, no cervical or submandibular lymphadenopathy   Cardiovascular: +S1/S2; RRR  Respiratory: CTA B/L; no crackles on exam  Gastrointestinal: soft, NT/ND; +BSx4  Extremities: 1+/2+ bilateral pitting edema; WWP; no clubbing, cyanosis, or chronic venous stasis  Vascular: 2+ radial, DP/PT pulses B/L  Neurological: AAOx4; no focal deficits; patient notably less confused this AM compared to prior exam    MEDICATIONS:  MEDICATIONS  (STANDING):  apixaban 5 milliGRAM(s) Oral every 12 hours  atorvastatin 10 milliGRAM(s) Oral at bedtime  furosemide   Injectable 20 milliGRAM(s) IV Push once  lisinopril 2.5 milliGRAM(s) Oral daily  metoprolol succinate ER 50 milliGRAM(s) Oral daily  sertraline 50 milliGRAM(s) Oral daily    MEDICATIONS  (PRN):      ALLERGIES:  Allergies    No Known Allergies    Intolerances        LABS:                        11.9   7.14  )-----------( 205      ( 14 Sep 2019 05:56 )             36.6     09-14    137  |  104  |  20  ----------------------------<  90  3.9   |  23  |  0.73    Ca    8.7      14 Sep 2019 05:56  Phos  3.6     09-13  Mg     1.6     09-14    TPro  5.8<L>  /  Alb  2.6<L>  /  TBili  1.4<H>  /  DBili  x   /  AST  15  /  ALT  7<L>  /  AlkPhos  82  09-12    PT/INR - ( 12 Sep 2019 14:11 )   PT: 15.6 sec;   INR: 1.37          PTT - ( 12 Sep 2019 14:11 )  PTT:29.8 sec    CAPILLARY BLOOD GLUCOSE          RADIOLOGY & ADDITIONAL TESTS: Reviewed.    ASSESSMENT:    PLAN:

## 2019-09-14 NOTE — PROGRESS NOTE ADULT - PROBLEM SELECTOR PLAN 1
Hx of CHF presenting from PCP office with bedside echo finding of 20%, from previous 50%, with increased GARCIA, difficulty walking, likely due to severe LE swelling and limited ADLs. Patient s/p 20mg IV lasix push in ED and 20 IV lasix push upon presentation to telemetry. Patient mildly fluid overlaoded with bilateral lower extremity edema on admission.   - This AM (9/14) SOB resolved per patient with improved lower extremity edema. Saturating well on room air. IV lasix 20mg push.  - Patient received 20mg IV push lasic this AM  - c/w IV lasix 20mg push PRN, per assessment of patient  -CXR AM 9/13 consistent with admission xray, both indicating cardiomegaly, thoracic aortic calcification and tortuosity. Bibasilar opacities/pleural effusions. Stable bony structures.   -CXR 9/14 unchanged from 9/13.  -Trops negativex2  -EP consulted for possible pacemaker placement in setting of widened LBBB and low EF -> patient to undergo ischemic workup per reccs  -HF team recommendation of ischemic workup: Lexiscan scheduled 9/16  -NPO prior to lexiscan 9/15  -Daily EKGs

## 2019-09-14 NOTE — PROGRESS NOTE ADULT - ASSESSMENT
83 year old female with medical history of afibb (on eliquis), multiple CVAs, HTN, HLD, and memory deficits (uncertain dementia) admitted from Dr. Rajput's office after presenting with GARCIA of one week's duration, found to have EF of 20% and moderate mitral regurgitation on bedside echocardiogram and widened LBBB on EKG. Patient admitted to telemetry for cardiac monitoring and diuresis undergoing ischemic workup.

## 2019-09-14 NOTE — PROGRESS NOTE ADULT - PROBLEM SELECTOR PLAN 2
Hx of afibb, with multiple CVA  - home eliquis 2.5; unclear reasoning for lower dose  - patient now receiving eliquis 5mg

## 2019-09-14 NOTE — PROGRESS NOTE ADULT - PROBLEM SELECTOR PLAN 5
Lapses in memory noted on exam, with confusion and possible memory deficits. Unclear baseline. Possibly attributed to hx of CVAs.   -CTH 9/13- Several sites of old cortical infarction in both cerebral hemispheres. No   acute intracranial hemorrhage, mass effect or CT evidence of recent   infarction.

## 2019-09-14 NOTE — DISCHARGE NOTE NURSING/CASE MANAGEMENT/SOCIAL WORK - [id number]_GEN_ALL_CORE
Risk factors for stroke/Stroke education booklet/Stroke support groups for patients, families, and friends/Need for follow up after discharge/Call 911 for stroke/Prescribed medications/Stroke warning signs and symptoms/Signs and symptoms of stroke

## 2019-09-14 NOTE — DISCHARGE NOTE NURSING/CASE MANAGEMENT/SOCIAL WORK - PATIENT PORTAL LINK FT
You can access the FollowMyHealth Patient Portal offered by [facility] by registering at the following website: [email or web address]. By joining [employer] [facility]’s FollowMyHealth portal, you will also be able to view your health information using other applications (apps) compatible with our system.

## 2019-09-15 LAB
ANION GAP SERPL CALC-SCNC: 8 MMOL/L — SIGNIFICANT CHANGE UP (ref 5–17)
BUN SERPL-MCNC: 17 MG/DL — SIGNIFICANT CHANGE UP (ref 7–23)
CALCIUM SERPL-MCNC: 8.6 MG/DL — SIGNIFICANT CHANGE UP (ref 8.4–10.5)
CHLORIDE SERPL-SCNC: 101 MMOL/L — SIGNIFICANT CHANGE UP (ref 96–108)
CO2 SERPL-SCNC: 26 MMOL/L — SIGNIFICANT CHANGE UP (ref 22–31)
CREAT SERPL-MCNC: 0.76 MG/DL — SIGNIFICANT CHANGE UP (ref 0.5–1.3)
GLUCOSE SERPL-MCNC: 85 MG/DL — SIGNIFICANT CHANGE UP (ref 70–99)
HCT VFR BLD CALC: 39.2 % — SIGNIFICANT CHANGE UP (ref 34.5–45)
HGB BLD-MCNC: 12.2 G/DL — SIGNIFICANT CHANGE UP (ref 11.5–15.5)
MAGNESIUM SERPL-MCNC: 1.6 MG/DL — SIGNIFICANT CHANGE UP (ref 1.6–2.6)
MCHC RBC-ENTMCNC: 30.2 PG — SIGNIFICANT CHANGE UP (ref 27–34)
MCHC RBC-ENTMCNC: 31.1 GM/DL — LOW (ref 32–36)
MCV RBC AUTO: 97 FL — SIGNIFICANT CHANGE UP (ref 80–100)
NRBC # BLD: 0 /100 WBCS — SIGNIFICANT CHANGE UP (ref 0–0)
PLATELET # BLD AUTO: 230 K/UL — SIGNIFICANT CHANGE UP (ref 150–400)
POTASSIUM SERPL-MCNC: 3.9 MMOL/L — SIGNIFICANT CHANGE UP (ref 3.5–5.3)
POTASSIUM SERPL-SCNC: 3.9 MMOL/L — SIGNIFICANT CHANGE UP (ref 3.5–5.3)
RBC # BLD: 4.04 M/UL — SIGNIFICANT CHANGE UP (ref 3.8–5.2)
RBC # FLD: 15.6 % — HIGH (ref 10.3–14.5)
SODIUM SERPL-SCNC: 135 MMOL/L — SIGNIFICANT CHANGE UP (ref 135–145)
WBC # BLD: 7.22 K/UL — SIGNIFICANT CHANGE UP (ref 3.8–10.5)
WBC # FLD AUTO: 7.22 K/UL — SIGNIFICANT CHANGE UP (ref 3.8–10.5)

## 2019-09-15 PROCEDURE — 99232 SBSQ HOSP IP/OBS MODERATE 35: CPT

## 2019-09-15 PROCEDURE — 71045 X-RAY EXAM CHEST 1 VIEW: CPT | Mod: 26

## 2019-09-15 RX ORDER — MAGNESIUM SULFATE 500 MG/ML
2 VIAL (ML) INJECTION ONCE
Refills: 0 | Status: COMPLETED | OUTPATIENT
Start: 2019-09-15 | End: 2019-09-15

## 2019-09-15 RX ORDER — POTASSIUM CHLORIDE 20 MEQ
20 PACKET (EA) ORAL ONCE
Refills: 0 | Status: COMPLETED | OUTPATIENT
Start: 2019-09-15 | End: 2019-09-15

## 2019-09-15 RX ORDER — FUROSEMIDE 40 MG
20 TABLET ORAL ONCE
Refills: 0 | Status: COMPLETED | OUTPATIENT
Start: 2019-09-15 | End: 2019-09-15

## 2019-09-15 RX ADMIN — Medication 20 MILLIEQUIVALENT(S): at 07:47

## 2019-09-15 RX ADMIN — ATORVASTATIN CALCIUM 10 MILLIGRAM(S): 80 TABLET, FILM COATED ORAL at 21:31

## 2019-09-15 RX ADMIN — Medication 20 MILLIGRAM(S): at 11:44

## 2019-09-15 RX ADMIN — Medication 50 GRAM(S): at 07:48

## 2019-09-15 RX ADMIN — Medication 50 MILLIGRAM(S): at 11:45

## 2019-09-15 RX ADMIN — APIXABAN 5 MILLIGRAM(S): 2.5 TABLET, FILM COATED ORAL at 17:08

## 2019-09-15 RX ADMIN — LISINOPRIL 2.5 MILLIGRAM(S): 2.5 TABLET ORAL at 11:47

## 2019-09-15 RX ADMIN — SERTRALINE 50 MILLIGRAM(S): 25 TABLET, FILM COATED ORAL at 14:31

## 2019-09-15 RX ADMIN — APIXABAN 5 MILLIGRAM(S): 2.5 TABLET, FILM COATED ORAL at 06:23

## 2019-09-15 NOTE — PROGRESS NOTE ADULT - ASSESSMENT
83 year old female with medical history of afibb (on eliquis), multiple CVAs, HTN, HLD, and memory deficits (?dementia) admitted from Dr. Rajput's office after presenting with GARCIA of one week's duration, found to have EF of 20% and moderate mitral regurgitation on bedside ECHO and widened LBBB on EKG. Patient admitted to telemetry for cardiac monitoring and diuresis undergoing ischemic workup with plan for consideration of Bi-V. 83 year old female with medical history of afibb (on eliquis), multiple CVAs, HTN, HLD, and memory deficits (?dementia) admitted from Dr. Rajput's office after presenting with GARCIA of one week's duration, found to have EF of 20% and moderate mitral regurgitation on bedside ECHO and widened LBBB on EKG. Patient admitted to telemetry for cardiac monitoring and diuresis undergoing ischemic workup with plan for consideration of Bi-V.     Hx of CHF presenting from PCP office with bedside echo finding of 20%, from previous 50%, with increased GARCIA, difficulty walking, likely due to severe LE swelling and limited ADLs. Patient clinically overloaded with b/l LE edema on exam, with adequate.   - This AM (9/14) SOB resolved per patient with improved lower extremity edema. Saturating well on room air. IV lasix 20mg push.  - Patient received 20mg IV push lasic this AM  - c/w IV lasix 20mg push PRN, per assessment of patient  -CXR AM 9/13 consistent with admission xray, both indicating cardiomegaly, thoracic aortic calcification and tortuosity. Bibasilar opacities/pleural effusions. Stable bony structures.   -CXR 9/14 unchanged from 9/13.  -Trops negativex2  -EP consulted for possible pacemaker placement in setting of widened LBBB and low EF -> patient to undergo ischemic workup per reccs  -HF team recommendation of ischemic workup: Lexiscan scheduled 9/16  -NPO prior to lexiscan 9/15  -Daily EKGs

## 2019-09-15 NOTE — PROGRESS NOTE ADULT - PROBLEM SELECTOR PLAN 3
History of osteoporosis. Deformities noted on exam  -Patient unable to clarify last dose of alendronate  -unable to restart inpatient, non-formulary weekly medication History of osteoporosis. Per record review, on Alendronate outpateint, however patient unable to clarify last dose of alendronate  -unable to restart inpatient, non-formulary weekly medication

## 2019-09-15 NOTE — PROGRESS NOTE ADULT - PROBLEM SELECTOR PLAN 4
History of CVA (most recent March 2017), possibly due to afibb  -c/w with eliquis 5mg  -will not require lovenox History of CVA (most recent March 2017), possibly 2/2 underlying history of Afib. CT Head (9/13) demonstrating old infarcts with no e/o recent infarcts.   -c/w with eliquis 5mg BID

## 2019-09-15 NOTE — PROGRESS NOTE ADULT - PROBLEM SELECTOR PLAN 5
Lapses in memory noted on exam, with confusion and possible memory deficits. Unclear baseline. Possibly attributed to hx of CVAs.   -CTH 9/13- Several sites of old cortical infarction in both cerebral hemispheres. No   acute intracranial hemorrhage, mass effect or CT evidence of recent   infarction. Lapses in memory noted on exam, with confusion and possible memory deficits. Unclear baseline. CT head (9/13) during this admission notable for several old cortical infarcts in both cerebral hemispheres. No e/o of acute ICH, mass effect or CT evidence of recent infarction.  -Continue to monitor

## 2019-09-15 NOTE — PROGRESS NOTE ADULT - PROBLEM SELECTOR PLAN 2
Hx of afibb, with multiple CVA  - home eliquis 2.5; unclear reasoning for lower dose  - patient now receiving eliquis 5mg Hx of afib, with history of multiple CVAs in the past (as confirmed on 9/13 CT head), CHADSVASc 7 for age, female, CHF history, history of CVA and HTN. Currently rate controlled  -c/w current AC, Eliquis 5 mg BID   -c/w Toprol XL 50 mg daily for rate control

## 2019-09-15 NOTE — PROGRESS NOTE ADULT - SUBJECTIVE AND OBJECTIVE BOX
INTERVAL HPI/OVERNIGHT EVENTS: Hypotensive with SBPs to 70s overnight, still maintaining MAPs>60s, asymptomatic at the time. Repeat BPs in 80s. Otherwise ASIA.    SUBJECTIVE: Patient seen and examined at bedside. Patient denies any fevers, chills, NS, dizziness, headaches, chest pain, palpitations, shortness of breath. Remainder of ROS negative.     OBJECTIVE:    VITAL SIGNS:  ICU Vital Signs Last 24 Hrs  T(C): 36.3 (15 Sep 2019 05:02), Max: 36.6 (14 Sep 2019 10:12)  T(F): 97.3 (15 Sep 2019 05:02), Max: 97.9 (14 Sep 2019 20:44)  HR: 58 (15 Sep 2019 05:00) (56 - 69)  BP: 92/67 (15 Sep 2019 05:00) (73/54 - 100/54)  BP(mean): 73 (15 Sep 2019 05:00) (58 - 81)  ABP: --  ABP(mean): --  RR: 25 (15 Sep 2019 05:00) (16 - 30)  SpO2: 92% (15 Sep 2019 05:00) (91% - 98%)        09-14 @ 07:01  -  09-15 @ 07:00  --------------------------------------------------------  IN: 520 mL / OUT: 850 mL / NET: -330 mL    09-15 @ 07:01  -  09-15 @ 07:33  --------------------------------------------------------  IN: 0 mL / OUT: 300 mL / NET: -300 mL      CAPILLARY BLOOD GLUCOSE          PHYSICAL EXAM:    General: thin, frail  female, NAD, lying comfortably in bed, not requiring supplemental 02  HEENT: NC/AT; PERRL, anicteric sclera, dry oral mucosa  Neck: supple, no JVD  Respiratory: CTA B/L; no W/R/R, non-labored breathing  Cardiovascular: +S1/S2; RRR; no M/R/G appreciated  Gastrointestinal: soft, NT/ND; +BS x4  Extremities: WWP; 2+ peripheral pulses B/L; + LE peripheral edema b/l (improved)  Skin: normal color and turgor; no rash  Neurological: alert, no-focal deficits, intact sensation throughout    MEDICATIONS:  MEDICATIONS  (STANDING):  apixaban 5 milliGRAM(s) Oral every 12 hours  atorvastatin 10 milliGRAM(s) Oral at bedtime  lisinopril 2.5 milliGRAM(s) Oral daily  metoprolol succinate ER 50 milliGRAM(s) Oral daily  sertraline 50 milliGRAM(s) Oral daily    MEDICATIONS  (PRN):      ALLERGIES:  Allergies    No Known Allergies    Intolerances        LABS:                        12.2   7.22  )-----------( 230      ( 15 Sep 2019 06:44 )             39.2     09-14    137  |  104  |  20  ----------------------------<  90  3.9   |  23  |  0.73    Ca    8.7      14 Sep 2019 05:56  Mg     1.6     09-14

## 2019-09-15 NOTE — PROGRESS NOTE ADULT - PROBLEM SELECTOR PLAN 6
F: tolerating PO, no IVF  E: replete K<4, Mg<2  N: Dash/TLC    VTE Prophylaxis: Lovenox SubQ  C: Full Code  D: 5LACH F: tolerating PO, no IVF indicated  E: Replete electrolytes PRN, Goal: K>4, Mg>2  N: Dash/TLC, NPO after MN for planned stress test on 9/16    DVT ppx: Lovenox SubQ  C: Full Code  D: 5LACH

## 2019-09-15 NOTE — PROGRESS NOTE ADULT - PROBLEM SELECTOR PLAN 1
Hx of CHF presenting from PCP office with bedside echo finding of 20%, from previous 50%, with increased GARCIA, difficulty walking, likely due to severe LE swelling and limited ADLs. Patient s/p 20mg IV lasix push in ED and 20 IV lasix push upon presentation to telemetry. Patient mildly fluid overlaoded with bilateral lower extremity edema on admission.   - This AM (9/14) SOB resolved per patient with improved lower extremity edema. Saturating well on room air. IV lasix 20mg push.  - Patient received 20mg IV push lasic this AM  - c/w IV lasix 20mg push PRN, per assessment of patient  -CXR AM 9/13 consistent with admission xray, both indicating cardiomegaly, thoracic aortic calcification and tortuosity. Bibasilar opacities/pleural effusions. Stable bony structures.   -CXR 9/14 unchanged from 9/13.  -Trops negativex2  -EP consulted for possible pacemaker placement in setting of widened LBBB and low EF -> patient to undergo ischemic workup per reccs  -HF team recommendation of ischemic workup: Lexiscan scheduled 9/16  -NPO prior to lexiscan 9/15  -Daily EKGs Hx of CHF presenting from PCP office with bedside echo finding of 20%, from previous 50%, with increased GARCIA, difficulty walking, likely due to severe LE swelling and limited ADLs. Patient clinically overloaded with b/l LE edema on exam, with adequate.   - c/w IV lasix 20mg push PRN, per assessment of patient, will give an additional dose Leasix 20 mg IVP x1 today (9/15)  -c/w Toprol XL 50 mg daily  -c/w Lisinopril 2.5 mg daily  -EP consulted for possible pacemaker placement in setting of widened LBBB and low EF -> patient to undergo ischemic workup per reccs, pending LEXISCAN 9/16  -NPO after MN tonight prior to lexiscan 9/16

## 2019-09-16 LAB
ANION GAP SERPL CALC-SCNC: 9 MMOL/L — SIGNIFICANT CHANGE UP (ref 5–17)
BUN SERPL-MCNC: 19 MG/DL — SIGNIFICANT CHANGE UP (ref 7–23)
CALCIUM SERPL-MCNC: 8.9 MG/DL — SIGNIFICANT CHANGE UP (ref 8.4–10.5)
CHLORIDE SERPL-SCNC: 102 MMOL/L — SIGNIFICANT CHANGE UP (ref 96–108)
CO2 SERPL-SCNC: 28 MMOL/L — SIGNIFICANT CHANGE UP (ref 22–31)
CREAT SERPL-MCNC: 0.79 MG/DL — SIGNIFICANT CHANGE UP (ref 0.5–1.3)
GLUCOSE SERPL-MCNC: 91 MG/DL — SIGNIFICANT CHANGE UP (ref 70–99)
HCT VFR BLD CALC: 40.5 % — SIGNIFICANT CHANGE UP (ref 34.5–45)
HGB BLD-MCNC: 12.8 G/DL — SIGNIFICANT CHANGE UP (ref 11.5–15.5)
MAGNESIUM SERPL-MCNC: 2 MG/DL — SIGNIFICANT CHANGE UP (ref 1.6–2.6)
MCHC RBC-ENTMCNC: 30.8 PG — SIGNIFICANT CHANGE UP (ref 27–34)
MCHC RBC-ENTMCNC: 31.6 GM/DL — LOW (ref 32–36)
MCV RBC AUTO: 97.6 FL — SIGNIFICANT CHANGE UP (ref 80–100)
NRBC # BLD: 0 /100 WBCS — SIGNIFICANT CHANGE UP (ref 0–0)
PLATELET # BLD AUTO: 231 K/UL — SIGNIFICANT CHANGE UP (ref 150–400)
POTASSIUM SERPL-MCNC: 4 MMOL/L — SIGNIFICANT CHANGE UP (ref 3.5–5.3)
POTASSIUM SERPL-SCNC: 4 MMOL/L — SIGNIFICANT CHANGE UP (ref 3.5–5.3)
RBC # BLD: 4.15 M/UL — SIGNIFICANT CHANGE UP (ref 3.8–5.2)
RBC # FLD: 15.5 % — HIGH (ref 10.3–14.5)
SODIUM SERPL-SCNC: 139 MMOL/L — SIGNIFICANT CHANGE UP (ref 135–145)
WBC # BLD: 7.32 K/UL — SIGNIFICANT CHANGE UP (ref 3.8–10.5)
WBC # FLD AUTO: 7.32 K/UL — SIGNIFICANT CHANGE UP (ref 3.8–10.5)

## 2019-09-16 PROCEDURE — 99232 SBSQ HOSP IP/OBS MODERATE 35: CPT

## 2019-09-16 RX ORDER — FUROSEMIDE 40 MG
20 TABLET ORAL ONCE
Refills: 0 | Status: COMPLETED | OUTPATIENT
Start: 2019-09-16 | End: 2019-09-16

## 2019-09-16 RX ADMIN — APIXABAN 5 MILLIGRAM(S): 2.5 TABLET, FILM COATED ORAL at 18:44

## 2019-09-16 RX ADMIN — Medication 20 MILLIGRAM(S): at 14:53

## 2019-09-16 RX ADMIN — LISINOPRIL 2.5 MILLIGRAM(S): 2.5 TABLET ORAL at 05:47

## 2019-09-16 RX ADMIN — ATORVASTATIN CALCIUM 10 MILLIGRAM(S): 80 TABLET, FILM COATED ORAL at 22:04

## 2019-09-16 RX ADMIN — APIXABAN 5 MILLIGRAM(S): 2.5 TABLET, FILM COATED ORAL at 05:47

## 2019-09-16 RX ADMIN — Medication 50 MILLIGRAM(S): at 14:53

## 2019-09-16 RX ADMIN — SERTRALINE 50 MILLIGRAM(S): 25 TABLET, FILM COATED ORAL at 14:53

## 2019-09-16 NOTE — PROGRESS NOTE ADULT - ASSESSMENT
83 year old female with medical history of afibb (on eliquis), multiple CVAs, HTN, HLD, and memory deficits (?dementia) admitted from Dr. Rajput's office after presenting with GARCIA of one week's duration, found to have EF of 20% and moderate mitral regurgitation on bedside ECHO and widened LBBB on EKG. Patient admitted to telemetry for cardiac monitoring and diuresis undergoing ischemic workup with plan for consideration of Bi-V.

## 2019-09-16 NOTE — PROGRESS NOTE ADULT - PROBLEM SELECTOR PLAN 2
Hx of afib, with history of multiple CVAs in the past (as confirmed on 9/13 CT head), CHADSVASc 7 for age, female, CHF history, history of CVA and HTN. Currently rate controlled  -c/w current AC, Eliquis 5 mg BID   -c/w Toprol XL 50 mg daily for rate control

## 2019-09-16 NOTE — PROGRESS NOTE ADULT - SUBJECTIVE AND OBJECTIVE BOX
OVERNIGHT EVENTS:    SUBJECTIVE / INTERVAL HPI: Patient seen and examined at bedside.     VITAL SIGNS:  Vital Signs Last 24 Hrs  T(C): 36.4 (16 Sep 2019 05:05), Max: 36.4 (15 Sep 2019 10:16)  T(F): 97.5 (16 Sep 2019 05:05), Max: 97.5 (15 Sep 2019 10:16)  HR: 60 (16 Sep 2019 00:02) (55 - 68)  BP: 109/63 (16 Sep 2019 00:02) (82/63 - 119/67)  BP(mean): 77 (16 Sep 2019 00:02) (60 - 80)  RR: 25 (16 Sep 2019 00:02) (9 - 25)  SpO2: 92% (16 Sep 2019 00:02) (92% - 98%)    PHYSICAL EXAM:    General: WDWN  HEENT: NC/AT; PERRL, anicteric sclera; MMM  Neck: supple  Cardiovascular: +S1/S2; RRR  Respiratory: CTA B/L; no W/R/R  Gastrointestinal: soft, NT/ND; +BSx4  Extremities: WWP; no edema, clubbing or cyanosis  Vascular: 2+ radial, DP/PT pulses B/L  Neurological: AAOx3; no focal deficits    MEDICATIONS:  MEDICATIONS  (STANDING):  apixaban 5 milliGRAM(s) Oral every 12 hours  atorvastatin 10 milliGRAM(s) Oral at bedtime  lisinopril 2.5 milliGRAM(s) Oral daily  metoprolol succinate ER 50 milliGRAM(s) Oral daily  sertraline 50 milliGRAM(s) Oral daily    MEDICATIONS  (PRN):      ALLERGIES:  Allergies    No Known Allergies    Intolerances        LABS:                        12.2   7.22  )-----------( 230      ( 15 Sep 2019 06:44 )             39.2     09-15    135  |  101  |  17  ----------------------------<  85  3.9   |  26  |  0.76    Ca    8.6      15 Sep 2019 06:44  Mg     1.6     09-15          CAPILLARY BLOOD GLUCOSE          RADIOLOGY & ADDITIONAL TESTS: Reviewed.    ASSESSMENT:    PLAN: OVERNIGHT EVENTS: No acute events overnight. [facility]:     SUBJECTIVE / INTERVAL HPI: Patient seen and examined at bedside this AM. Indicates she is doing much better. Denies CP, SOB, n/v, palpations. Endorses improvement of LE edema with increased ability to walk and balance. Patient indicates she would to go home to pay the bills. ROS otherwise negative.      VITAL SIGNS:  Vital Signs Last 24 Hrs  T(C): 36.4 (16 Sep 2019 05:05), Max: 36.4 (15 Sep 2019 10:16)  T(F): 97.5 (16 Sep 2019 05:05), Max: 97.5 (15 Sep 2019 10:16)  HR: 60 (16 Sep 2019 00:02) (55 - 68)  BP: 109/63 (16 Sep 2019 00:02) (82/63 - 119/67)  BP(mean): 77 (16 Sep 2019 00:02) (60 - 80)  RR: 25 (16 Sep 2019 00:02) (9 - 25)  SpO2: 92% (16 Sep 2019 00:02) (92% - 98%)    PHYSICAL EXAM:    General: WDWN  HEENT: NC/AT; PERRL, anicteric sclera; MMM  Neck: supple  Cardiovascular: +S1/S2; RRR  Respiratory: CTA B/L; no W/R/R  Gastrointestinal: soft, NT/ND; +BSx4  Extremities: WWP; no edema, clubbing or cyanosis  Vascular: 2+ radial, DP/PT pulses B/L  Neurological: AAOx3; no focal deficits    MEDICATIONS:  MEDICATIONS  (STANDING):  apixaban 5 milliGRAM(s) Oral every 12 hours  atorvastatin 10 milliGRAM(s) Oral at bedtime  lisinopril 2.5 milliGRAM(s) Oral daily  metoprolol succinate ER 50 milliGRAM(s) Oral daily  sertraline 50 milliGRAM(s) Oral daily    MEDICATIONS  (PRN):      ALLERGIES:  Allergies    No Known Allergies    Intolerances        LABS:                        12.2   7.22  )-----------( 230      ( 15 Sep 2019 06:44 )             39.2     09-15    135  |  101  |  17  ----------------------------<  85  3.9   |  26  |  0.76    Ca    8.6      15 Sep 2019 06:44  Mg     1.6     09-15          CAPILLARY BLOOD GLUCOSE          RADIOLOGY & ADDITIONAL TESTS: Reviewed.    ASSESSMENT:    PLAN: OVERNIGHT EVENTS: No acute events overnight. [facility] I/O:     SUBJECTIVE / INTERVAL HPI: Patient seen and examined at bedside this AM. Indicates she is doing much better. Denies CP, SOB, n/v, palpations. Endorses improvement of LE edema with increased ability to walk and balance. Patient indicates she would to go home to pay the bills. ROS otherwise negative.      VITAL SIGNS:  Vital Signs Last 24 Hrs  T(C): 36.4 (16 Sep 2019 05:05), Max: 36.4 (15 Sep 2019 10:16)  T(F): 97.5 (16 Sep 2019 05:05), Max: 97.5 (15 Sep 2019 10:16)  HR: 60 (16 Sep 2019 00:02) (55 - 68)  BP: 109/63 (16 Sep 2019 00:02) (82/63 - 119/67)  BP(mean): 77 (16 Sep 2019 00:02) (60 - 80)  RR: 25 (16 Sep 2019 00:02) (9 - 25)  SpO2: 92% (16 Sep 2019 00:02) (92% - 98%)    PHYSICAL EXAM:    General: WDWN elderly female resting comfortably in bed  HEENT: NC/AT; PERRL, anicteric sclera; dry mucus membranes  Neck: supple; no cervical or submandibular lymphadenopathy; no JVD  Cardiovascular: +S1/S2; RRR; no murmurs rubs or gallops apperciated  Respiratory: CTA B/L; no W/R/R  Gastrointestinal: soft, NT/ND; +BSx4  Extremities: WWP; no edema, clubbing or cyanosis  Vascular: 2+ radial, DP/PT pulses B/L  Neurological: AAOx3; no focal deficits    MEDICATIONS:  MEDICATIONS  (STANDING):  apixaban 5 milliGRAM(s) Oral every 12 hours  atorvastatin 10 milliGRAM(s) Oral at bedtime  lisinopril 2.5 milliGRAM(s) Oral daily  metoprolol succinate ER 50 milliGRAM(s) Oral daily  sertraline 50 milliGRAM(s) Oral daily    MEDICATIONS  (PRN):      ALLERGIES:  Allergies    No Known Allergies    Intolerances        LABS:                        12.2   7.22  )-----------( 230      ( 15 Sep 2019 06:44 )             39.2     09-15    135  |  101  |  17  ----------------------------<  85  3.9   |  26  |  0.76    Ca    8.6      15 Sep 2019 06:44  Mg     1.6     09-15          CAPILLARY BLOOD GLUCOSE          RADIOLOGY & ADDITIONAL TESTS: Reviewed.    ASSESSMENT:    PLAN:

## 2019-09-16 NOTE — PROGRESS NOTE ADULT - PROBLEM SELECTOR PLAN 3
History of osteoporosis. Per record review, on Alendronate outpateint, however patient unable to clarify last dose of alendronate  -unable to restart inpatient, non-formulary weekly medication

## 2019-09-16 NOTE — PROGRESS NOTE ADULT - PROBLEM SELECTOR PLAN 5
Lapses in memory noted on exam, with confusion and possible memory deficits. Unclear baseline. CT head (9/13) during this admission notable for several old cortical infarcts in both cerebral hemispheres. No e/o of acute ICH, mass effect or CT evidence of recent infarction.  -Continue to monitor

## 2019-09-16 NOTE — PROGRESS NOTE ADULT - PROBLEM SELECTOR PLAN 4
History of CVA (most recent March 2017), possibly 2/2 underlying history of Afib. CT Head (9/13) demonstrating old infarcts with no e/o recent infarcts.   -c/w with eliquis 5mg BID

## 2019-09-16 NOTE — PROGRESS NOTE ADULT - PROBLEM SELECTOR PLAN 6
F: tolerating PO, no IVF indicated  E: Replete electrolytes PRN, Goal: K>4, Mg>2  N: Dash/TLC, NPO after MN for planned stress test on 9/16    DVT ppx: Lovenox SubQ  C: Full Code  D: 5LACH

## 2019-09-16 NOTE — PROGRESS NOTE ADULT - PROBLEM SELECTOR PLAN 1
Hx of CHF presenting from PCP office with bedside echo finding of 20%, from previous 50%, with increased GARCIA, difficulty walking, likely due to severe LE swelling and limited ADLs. Patient clinically overloaded with b/l LE edema on exam, with adequate.   - c/w IV lasix 20mg push PRN, per assessment of patient, will give an additional dose Leasix 20 mg IVP x1 today (9/15)  -c/w Toprol XL 50 mg daily  -c/w Lisinopril 2.5 mg daily  -EP consulted for possible pacemaker placement in setting of widened LBBB and low EF -> patient to undergo ischemic workup per reccs, pending LEXISCAN 9/16  -NPO after MN tonight prior to lexiscan 9/16 Hx of CHF presenting from PCP office with bedside echo finding of 20%, from previous 50%, with increased GARCIA, difficulty walking, likely due to severe LE swelling and limited ADLs. Patient clinically overloaded with b/l LE edema on exam, with adequate.   - c/w IV lasix 20mg push PRN, per assessment of patient, will give an additional dose Leasix 20 mg IVP x1 today (9/16)  -c/w Toprol XL 50 mg daily  -c/w Lisinopril 2.5 mg daily  -EP consulted for possible pacemaker placement in setting of widened LBBB and low EF -> patient to undergo ischemic workup per reccs, pending LEXISCAN 9/16

## 2019-09-17 ENCOUNTER — TRANSCRIPTION ENCOUNTER (OUTPATIENT)
Age: 84
End: 2019-09-17

## 2019-09-17 PROCEDURE — 99232 SBSQ HOSP IP/OBS MODERATE 35: CPT

## 2019-09-17 PROCEDURE — 93018 CV STRESS TEST I&R ONLY: CPT

## 2019-09-17 PROCEDURE — 78452 HT MUSCLE IMAGE SPECT MULT: CPT | Mod: 26

## 2019-09-17 PROCEDURE — 93016 CV STRESS TEST SUPVJ ONLY: CPT

## 2019-09-17 RX ORDER — FUROSEMIDE 40 MG
20 TABLET ORAL DAILY
Refills: 0 | Status: DISCONTINUED | OUTPATIENT
Start: 2019-09-17 | End: 2019-09-18

## 2019-09-17 RX ADMIN — Medication 20 MILLIGRAM(S): at 12:53

## 2019-09-17 RX ADMIN — APIXABAN 5 MILLIGRAM(S): 2.5 TABLET, FILM COATED ORAL at 18:19

## 2019-09-17 RX ADMIN — SERTRALINE 50 MILLIGRAM(S): 25 TABLET, FILM COATED ORAL at 12:53

## 2019-09-17 RX ADMIN — LISINOPRIL 2.5 MILLIGRAM(S): 2.5 TABLET ORAL at 06:30

## 2019-09-17 RX ADMIN — ATORVASTATIN CALCIUM 10 MILLIGRAM(S): 80 TABLET, FILM COATED ORAL at 22:15

## 2019-09-17 RX ADMIN — Medication 50 MILLIGRAM(S): at 12:53

## 2019-09-17 RX ADMIN — APIXABAN 5 MILLIGRAM(S): 2.5 TABLET, FILM COATED ORAL at 06:30

## 2019-09-17 NOTE — PROGRESS NOTE ADULT - SUBJECTIVE AND OBJECTIVE BOX
ELECTROPHYSIOLOGY NP PROGRESS NOTE    Subjective: Patient seen and examined by me. Denies complaints.  Remainder ROS otherwise negative.    Overnight Events: Pharm stress test revealed mildly reduced tracer uptake in the apical inferior and mid inferolateral walls on both rest   and stress images. Quantitative analysis does not demonstrate any significant areas of decreased tracer concentration. There is no evidence   of ischemic dilation of the left ventricle. The gated images demonstrate dilated left ventricular chamber size with severe global hypokinesis on   both stress and rest images. The ejection fraction is 29%. Additionally the right ventricle was not well-visualized.    TELEMETRY:    EKG:      VITAL SIGNS:  T(C): 36.3 (09-17-19 @ 05:23), Max: 36.7 (09-16-19 @ 18:26)  HR: 72 (09-17-19 @ 11:35) (60 - 72)  BP: 97/66 (09-17-19 @ 11:35) (84/51 - 110/67)  RR: 18 (09-17-19 @ 11:35) (17 - 26)  SpO2: 93% (09-17-19 @ 11:35) (92% - 96%)      I&O's Summary    16 Sep 2019 07:01  -  17 Sep 2019 07:00  --------------------------------------------------------  IN: 350 mL / OUT: 1700 mL / NET: -1350 mL      PHYSICAL EXAM:  Awake, talaktive, in NAD  AXOX3, follows commands, appropriate  Neck supple, no JVD noted  PEERL, nasal/buccal mucosa moist and well perfused  BS clear bilaterally, unlabored, symmetrical  S1/S2, no S3, RRR, no M/G/R noted  Abdomen soft, non tender, non distended  No edema noted, perfusion brisk  Pulses palpable throughout  Skin warm and dry, no rashes/lesions noted      LABS:                          12.8   7.32  )-----------( 231      ( 16 Sep 2019 06:21 )             40.5                              09-16    139  |  102  |  19  ----------------------------<  91  4.0   |  28  |  0.79    Ca    8.9      16 Sep 2019 06:21  Mg     2.0     09-16    Allergies:  No Known Allergies    MEDICATIONS  (STANDING):  apixaban 5 milliGRAM(s) Oral every 12 hours  atorvastatin 10 milliGRAM(s) Oral at bedtime  furosemide    Tablet 20 milliGRAM(s) Oral daily  lisinopril 2.5 milliGRAM(s) Oral daily  metoprolol succinate ER 50 milliGRAM(s) Oral daily  sertraline 50 milliGRAM(s) Oral daily    MEDICATIONS  (PRN):        DIAGNOSTIC TESTS:   Pharm Nuclear  The overall quality of the imaging is excellent. Visual analysis of the   rest and stress tomographic images demonstrates mildly reduced tracer   uptake in the apical inferior and mid inferolateral walls on both rest   and stress images. Quantitative analysis does not demonstrate any   significant areas of decreased tracer concentration. There is no evidence   of ischemic dilation of the left ventricle. The gated images demonstrate   dilated left ventricular chamber size with severe global hypokinesis on   both stress and rest images. The ejection fraction is 29%. Additionally   the right ventricle was not well-visualized. ELECTROPHYSIOLOGY NP PROGRESS NOTE    Subjective: Patient seen and examined by me. Denies complaints.  Remainder ROS otherwise negative.    Overnight Events: Pharm stress test revealed mildly reduced tracer uptake in the apical inferior and mid inferolateral walls on both rest   and stress images. Quantitative analysis does not demonstrate any significant areas of decreased tracer concentration. There is no evidence   of ischemic dilation of the left ventricle. The gated images demonstrate dilated left ventricular chamber size with severe global hypokinesis on   both stress and rest images. The ejection fraction is 29%. Additionally the right ventricle was not well-visualized.    TELEMETRY: AF 58-62, some junctional escape beats    VITAL SIGNS:  T(C): 36.3 (09-17-19 @ 05:23), Max: 36.7 (09-16-19 @ 18:26)  HR: 72 (09-17-19 @ 11:35) (60 - 72)  BP: 97/66 (09-17-19 @ 11:35) (84/51 - 110/67)  RR: 18 (09-17-19 @ 11:35) (17 - 26)  SpO2: 93% (09-17-19 @ 11:35) (92% - 96%)      I&O's Summary    16 Sep 2019 07:01  -  17 Sep 2019 07:00  --------------------------------------------------------  IN: 350 mL / OUT: 1700 mL / NET: -1350 mL      PHYSICAL EXAM:  Awake, talaktive, in NAD  AXOX3, follows commands, appropriate  Neck supple, no JVD noted  PEERL, nasal/buccal mucosa moist and well perfused  BS clear bilaterally, unlabored, symmetrical  S1/S2, no S3, no M/G/R noted  Abdomen soft, non tender, non distended  No edema noted, perfusion brisk  Pulses palpable throughout  Skin warm and dry, no rashes/lesions noted      LABS:                          12.8   7.32  )-----------( 231      ( 16 Sep 2019 06:21 )             40.5                              09-16    139  |  102  |  19  ----------------------------<  91  4.0   |  28  |  0.79    Ca    8.9      16 Sep 2019 06:21  Mg     2.0     09-16    Allergies:  No Known Allergies    MEDICATIONS  (STANDING):  apixaban 5 milliGRAM(s) Oral every 12 hours  atorvastatin 10 milliGRAM(s) Oral at bedtime  furosemide    Tablet 20 milliGRAM(s) Oral daily  lisinopril 2.5 milliGRAM(s) Oral daily  metoprolol succinate ER 50 milliGRAM(s) Oral daily  sertraline 50 milliGRAM(s) Oral daily    MEDICATIONS  (PRN):    DIAGNOSTIC TESTS:   Pharm Nuclear  The overall quality of the imaging is excellent. Visual analysis of the   rest and stress tomographic images demonstrates mildly reduced tracer   uptake in the apical inferior and mid inferolateral walls on both rest   and stress images. Quantitative analysis does not demonstrate any   significant areas of decreased tracer concentration. There is no evidence   of ischemic dilation of the left ventricle. The gated images demonstrate   dilated left ventricular chamber size with severe global hypokinesis on   both stress and rest images. The ejection fraction is 29%. Additionally   the right ventricle was not well-visualized.    ASSESSMENT/PLAN:  83 year old female with medical history of afibb (on eliquis), multiple CVAs, HTN, HLD, and some cognitive deficits admitted from Dr. Rajput's office after presenting with GARCIA of one week's duration, found to have EF of 20% and moderate mitral regurgitation on bedside ECHO and widened LBBB on EKG. Patient admitted to telemetry for cardiac monitoring, pharm nuc reveals no ischemia and EF 29%, widened QRS on EKG with LBBB good candidate for BiV.     NPO MN for BiV placement    Plan discussed with patient and Dr. Juniro.

## 2019-09-17 NOTE — DIETITIAN INITIAL EVALUATION ADULT. - ENERGY NEEDS
Height 65"; .7# (most recent); #; 96%IBW  ActualBW used for calculations as pt between % of IBW. Needs estimated for maintenance in older adults; fluids per team (1.5L/day)

## 2019-09-17 NOTE — DIETITIAN INITIAL EVALUATION ADULT. - OTHER INFO
84 yo/female with PMHx Afib, CVA, HTN, HLD, presented with GARCIA. Found to have EF of 20% and moderate MR. S/p Lexiscan test on 9/16 and 9/17, w/possible plan for Bi-V placement tomorrow (9/18). Pt seen in room, awake, alert, very pleasant. She endorses eating well at home, sometimes cooks or will eat out. Likes to have a variety of foods including chicken, fruits, vegetables. Observed 100% intake at lunch today, though pt does report that this is the best that her appetite has been for a few days. No N/V/C/D reported at this time. +BM 9/16. NKFA. No difficulty chewing or swallowing. Skin intact pressure-wise; LE edema much improved. No pain endorsed at time of visit. Heart healthy nutrition education reviewed and handout provided.

## 2019-09-17 NOTE — DISCHARGE NOTE PROVIDER - NSDCFUSCHEDAPPT_GEN_ALL_CORE_FT
PETRA VILLANUEVA ; 11/04/2019 ; [facility] [address] PETRA VILLANUEVA ; 10/17/2019 ; [facility] [address]  PETRA VILLANUEVA ; 11/04/2019 ; [facility] [address]  PETRA VILLANUEVA ; 10/17/2019 ; [facility] [address]  PETRA VILLANUEVA ; 11/04/2019 ; [facility] [address]  [facility] ; 09/26/2019 ; [facility] [address] [facility] ; 10/17/2019 ; [facility] [address]  [facility] ; 11/04/2019 ; [facility] [address] [facility] ; 09/26/2019 ; [facility] [address] [facility] ; 10/17/2019 ; [facility] [address]  [facility] ; 11/04/2019 ; [facility] [address] [facility] ; 09/26/2019 ; [facility] [address] [facility] ; 10/17/2019 ; [facility] [address]  [facility] ; 11/04/2019 ; [facility] [address]

## 2019-09-17 NOTE — PROGRESS NOTE ADULT - SUBJECTIVE AND OBJECTIVE BOX
INTERVAL HISTORY:  s/p nuc, no ischemia  	  MEDICATIONS:  furosemide    Tablet 20 milliGRAM(s) Oral daily  lisinopril 2.5 milliGRAM(s) Oral daily  metoprolol succinate ER 50 milliGRAM(s) Oral daily        sertraline 50 milliGRAM(s) Oral daily      atorvastatin 10 milliGRAM(s) Oral at bedtime    apixaban 5 milliGRAM(s) Oral every 12 hours        PHYSICAL EXAM:  T(C): 36.8 (09-17-19 @ 13:02), Max: 36.8 (09-17-19 @ 13:02)  HR: 78 (09-17-19 @ 15:55) (60 - 78)  BP: 122/65 (09-17-19 @ 15:55) (84/51 - 122/65)  RR: 18 (09-17-19 @ 14:01) (17 - 24)  SpO2: 95% (09-17-19 @ 14:01) (92% - 96%)  Wt(kg): --  I&O's Summary    16 Sep 2019 07:01  -  17 Sep 2019 07:00  --------------------------------------------------------  IN: 350 mL / OUT: 1700 mL / NET: -1350 mL    17 Sep 2019 07:01  -  17 Sep 2019 17:23  --------------------------------------------------------  IN: 240 mL / OUT: 500 mL / NET: -260 mL          Appearance: Normal	  HEENT:   Normal oral mucosa, PERRL, EOMI	  Lymphatic: No lymphadenopathy  Cardiovascular: Irregualr, variable S1 S2, No JVD, ASM, +edema  Respiratory: Lungs clear to auscultation	  Psychiatry: A & O x 3, Mood & affect appropriate  Gastrointestinal:  Soft, Non-tender, + BS	  Skin: No rashes, No ecchymoses, No cyanosis  Neurologic: Non-focal  Extremities: Normal range of motion, No clubbing, cyanosis    Vascular: Peripheral pulses palpable 2+ bilaterally    TELEMETRY: 	    ECG:  	  RADIOLOGY:   DIAGNOSTIC TESTING:  [ ] Echocardiogram:  [ ]  Catheterization:  [ ] Stress Test:    OTHER: 	    LABS:	 	    CARDIAC MARKERS:                                  12.8   7.32  )-----------( 231      ( 16 Sep 2019 06:21 )             40.5     09-16    139  |  102  |  19  ----------------------------<  91  4.0   |  28  |  0.79    Ca    8.9      16 Sep 2019 06:21  Mg     2.0     09-16      proBNP:   Lipid Profile:   HgA1c:   TSH:     ASSESSMENT/PLAN:

## 2019-09-17 NOTE — PROGRESS NOTE ADULT - PROBLEM SELECTOR PLAN 1
recommend BiV pacer for EF 20%.  Will also need an ischemia w/u.  Nuc non-ischemia.  Hold Eliquis if needed, did have a CVA in the past, consider heparin bridge.

## 2019-09-17 NOTE — DISCHARGE NOTE PROVIDER - NSDCCPCAREPLAN_GEN_ALL_CORE_FT
PRINCIPAL DISCHARGE DIAGNOSIS  Diagnosis: Shortness of breath  Assessment and Plan of Treatment: PRINCIPAL DISCHARGE DIAGNOSIS  Diagnosis: Congestive heart failure  Assessment and Plan of Treatment: You had an exacerbation of your heart failure which resulted in your shortness of breath and leg edema.        SECONDARY DISCHARGE DIAGNOSES  Diagnosis: Chronic atrial fibrillation  Assessment and Plan of Treatment: Chronic atrial fibrillation PRINCIPAL DISCHARGE DIAGNOSIS  Diagnosis: Congestive heart failure  Assessment and Plan of Treatment: You had an exacerbation of your heart failure which resulted in your shortness of breath and leg swelling. We found that your heart was not pumping blood effectively to your body. Because your blood was not pumping effectively, blood was also backing up into your lungs and legs causing your shortness of breath and leg swelling. To help your heart pump blood more effectively, we put in a pacemaker to assist the heart. After the procedure, we noted some blood oozing from the site of the pacemaker. If you note continued oozing of blood from the pacemaker site, please call Dr. Rajput and discuss it with him. If the bleeding does not stop or is significant, please go to an emergency department.      SECONDARY DISCHARGE DIAGNOSES  Diagnosis: Chronic atrial fibrillation  Assessment and Plan of Treatment: Chronic atrial fibrillation  You have a diagnosis of atrial fibrillation. This is an irregular, often rapid heart rate that commonly causes poor blood flow. The heart's upper chambers (atria) beat out of coordination with the lower chambers (ventricles). This condition may have no symptoms, but when symptoms do appear they include palpitations, shortness of breath, and fatigue. Treatments include drugs, electrical shock (cardioversion), and minimally invasive surgery (ablation). Continue to take your blood thinner and rate controlling medications as directed. Please continue to take your medications as prescribed and follow up with your primary care doctor and cardiologist.

## 2019-09-17 NOTE — DISCHARGE NOTE PROVIDER - INSTRUCTIONS
Please eat a diet low in sodium and saturated fats. Please continue to eat a diet low in sodium and cholesterol/saturated fats.

## 2019-09-17 NOTE — DISCHARGE NOTE PROVIDER - CARE PROVIDER_API CALL
Sukhdeep Rajput)  Cardiovascular Disease; Internal Medicine  Cardiology [facility], [address] [place] [address]  Phone: [phone]  Fax: [phone]  Follow Up Time:

## 2019-09-17 NOTE — DIETITIAN INITIAL EVALUATION ADULT. - ADD RECOMMEND
1. Encourage PO intake 2. Strict I&Os and daily wts 3. Monitor lytes and replete prn. 4. Reinforce diet education

## 2019-09-17 NOTE — DISCHARGE NOTE PROVIDER - HOSPITAL COURSE
83 year old female with medical history of afibb (on eliquis), multiple CVAs, HTN, HLD, and memory deficits (uncertain dementia) presenting from cardiologist's office with bedside echo demonstrating EF of 20%, from 50% prior, moderate mitral regurgitation on bedside echocardiogram and widened LBBB on EKG.  Patient symptomatic with GARCIA and decreased ADLs due to bilateral lower extremity edema of 7-10 days duration. Patient admitted to cardiac telemetry.         Problem List/Main Diagnoses (system-based):         Inpatient treatment course: Patient admitted for for new echocardiogram findings, undergoing ischemic up and BiV placement. CXR demonstrating _________. Patient actively diuresed with lasix 20mg IV push PRN, with improvement of bilateral bibasilar crackles and lower extremity edema, with increased range of motion. Echocardiogram indicated        Lexiscan indicated ___________. Patient under BiV pacemaker placement and cardiac catheterization. Lapses in memory noted on exam, with confusion and possible memory deficits, although baseline unclear. Patient reports questionable recent fall. CT head  notable for several old cortical infarcts in both cerebral hemispheres. No e/o of acute ICH, mass effect or CT evidence of recent infarction.Physical therapy evaluated patient, patient discharged to [facility].                     New medications: Lasix 20mg PO        Labs to be followed outpatient:         Exam to be followed outpatient: Comprehensive physical exam 83 year old female with medical history of afibb (on eliquis), multiple CVAs, HTN, HLD, and memory deficits (uncertain dementia) presenting from cardiologist's office with bedside echo demonstrating EF of 20%, from 50% prior, moderate mitral regurgitation on bedside echocardiogram and widened LBBB on EKG.  Patient symptomatic with GARCIA and decreased ADLs due to bilateral lower extremity edema of 7-10 days duration. Patient admitted to cardiac telemetry.         Problem List/Main Diagnoses (system-based):         Inpatient treatment course: Patient admitted for for new echocardiogram findings, undergoing ischemic up and BiV placement. BNP 59845. Trops [id number] demonstrating cardiomegaly, thoracic aortic calcification and tortuosity; bibasilar opacities/pleural effusions. Patient actively diuresed with lasix 20mg IV push PRN, with improvement of bilateral bibasilar crackles and lower extremity edema, with increased range of motion. Echocardiogram indicated EF of 35%. Lexiscan indicated a small, fixed defect in the inferior and inferolateral walls which may represent infarction or attenuation artifact. Overall left ventricle is dilated and systolic  function is globally severely reduced, EF 29%. The ECG portion is non-diagnostic for ischemia given baseline LBBB. Patient underwent BiV pacemaker placement and cardiac catheterization. Lapses in memory noted on exam, with confusion and possible memory deficits, although baseline unclear. Patient reports questionable recent fall. CT head  notable for several old cortical infarcts in both cerebral hemispheres. No e/o of acute ICH, mass effect or CT evidence of recent infarction.Physical therapy evaluated patient, patient discharged to [facility].                     New medications: Lasix 20mg PO        Labs to be followed outpatient:         Exam to be followed outpatient: Comprehensive physical exam 83 year old female with medical history of a.fib (on Eliquis), multiple CVAs, HTN, HLD, and memory deficits (uncertain dementia) presenting from cardiologist's office with bedside echo demonstrating EF of 20%, from 50% prior, and moderate mitral regurgitation on bedside echocardiogram with widened LBBB on EKG. Patient was symptomatic with GARCIA and decreased ADLs due to bilateral lower extremity edema of 7-10 days duration. Patient admitted to cardiac telemetry for CHF exacerbation and workup.        Problem List/Main Diagnoses (system-based):     1. acute on chronic CHF    2. A. fib        Inpatient treatment course: Patient admitted for for new echocardiogram findings, undergoing ischemic up and BiV placement. BNP 49147. Trops negx2 and CXR demonstrating cardiomegaly, thoracic aortic calcification and tortuosity; bibasilar opacities/pleural effusions. Patient actively diuresed with lasix 20mg IV push PRN, with improvement of bilateral bibasilar crackles and lower extremity edema, with increased range of motion. Echocardiogram indicated EF of 35%. Lexiscan indicated a small, fixed defect in the inferior and inferolateral walls which may represent infarction or attenuation artifact. Overall left ventricle is dilated and systolic  function is globally severely reduced, EF 29%. The ECG portion is non-diagnostic for ischemia given baseline LBBB. Patient underwent BiV pacemaker placement on 9/18 and cardiac catheterization. Significant oozing was noted at BiV PPM site, and dressing was changed several times. Lapses in memory noted on exam, with confusion and possible memory deficits, although baseline unclear. Patient reports questionable recent fall. CT head  notable for several old cortical infarcts in both cerebral hemispheres. No e/o of acute ICH, mass effect or CT evidence of recent infarction. Physical therapy evaluated patient, patient discharged to [facility].        New medications:     Labs to be followed outpatient:     Exam to be followed outpatient: Comprehensive physical exam; continue to monitor BiV site for oozing 83 year-old female with medical history of a.fib (on Eliquis), multiple CVAs, HTN, HLD, and memory deficits (uncertain dementia) presenting from cardiologist's office with bedside echo demonstrating EF of 20%, from 50% prior, and moderate mitral regurgitation on bedside echocardiogram with widened LBBB on EKG. Patient was symptomatic with GARCIA and decreased ADLs due to bilateral lower extremity edema of 7-10 days duration. Patient admitted to cardiac telemetry for CHF exacerbation and workup s/p BiV PPM placement.        Problem List/Main Diagnoses (system-based):     1. acute on chronic CHF    2. A. fib        Inpatient treatment course: Patient admitted for for new echocardiogram findings, undergoing ischemic up and BiV placement. BNP 44954. Trops negx2 and CXR demonstrating cardiomegaly, thoracic aortic calcification and tortuosity; bibasilar opacities/pleural effusions. Patient actively diuresed with lasix 20mg IV push PRN, with improvement of bilateral bibasilar crackles and lower extremity edema, with increased range of motion. Echocardiogram indicated EF of 35%. Lexiscan indicated a small, fixed defect in the inferior and inferolateral walls which may represent infarction or attenuation artifact. Overall left ventricle is dilated and systolic  function is globally severely reduced, EF 29%. The ECG portion is non-diagnostic for ischemia given baseline LBBB. Patient underwent BiV pacemaker placement on 9/18 and cardiac catheterization. Significant oozing was noted at BiV PPM site, and dressing was changed several times. Lapses in memory noted on exam, with confusion and possible memory deficits, although baseline unclear. Patient reports questionable recent fall. CT head  notable for several old cortical infarcts in both cerebral hemispheres. No e/o of acute ICH, mass effect or CT evidence of recent infarction. Physical therapy evaluated patient, patient discharged to [facility].        New medications:     Labs to be followed outpatient:     Exam to be followed outpatient: Comprehensive physical exam; continue to monitor BiV site for oozing 83 year-old female with medical history of a.fib (on Eliquis), multiple CVAs, HTN, HLD, and memory deficits (uncertain dementia) presenting from cardiologist's office with bedside echo demonstrating EF of 20%, from 50% prior, and moderate mitral regurgitation on bedside echocardiogram with widened LBBB on EKG. Patient was symptomatic with GARCIA and decreased ADLs due to bilateral lower extremity edema of 7-10 days duration. Patient admitted to cardiac telemetry for CHF exacerbation and workup s/p BiV PPM placement.        Problem List/Main Diagnoses (system-based):     1. acute on chronic CHF    2. A. fib        Inpatient treatment course: Patient admitted for for new echocardiogram findings, undergoing ischemic up and BiV placement. BNP 23142. Trops negx2 and CXR demonstrating cardiomegaly, thoracic aortic calcification and tortuosity; bibasilar opacities/pleural effusions. Patient actively diuresed with lasix 20mg IV push PRN, with improvement of bilateral bibasilar crackles and lower extremity edema, with increased range of motion. Echocardiogram indicated EF of 35%. Lexiscan indicated a small, fixed defect in the inferior and inferolateral walls which may represent infarction or attenuation artifact. Overall left ventricle is dilated and systolic  function is globally severely reduced, EF 29%. The ECG portion is non-diagnostic for ischemia given baseline LBBB. Patient underwent BiV pacemaker placement on 9/18 and cardiac catheterization. Significant oozing was noted at BiV PPM site, and dressing was changed several times. Lapses in memory noted on exam, with confusion and possible memory deficits, although baseline unclear. Patient reports questionable recent fall. CT head  notable for several old cortical infarcts in both cerebral hemispheres. No e/o of acute ICH, mass effect or CT evidence of recent infarction. Physical therapy evaluated patient, patient discharged to [facility].        New medications:     Labs to be followed outpatient:     Exam to be followed outpatient: Comprehensive physical exam; continue to monitor BiV site for oozing 83 year-old female with medical history of a.fib (on Eliquis), multiple CVAs, HTN, HLD, and memory deficits (uncertain dementia) presenting from cardiologist's office with bedside echo demonstrating EF of 20%, from 50% prior, and moderate mitral regurgitation on bedside echocardiogram with widened LBBB on EKG. Patient was symptomatic with GARCIA and decreased ADLs due to bilateral lower extremity edema of 7-10 days duration. Patient admitted to cardiac telemetry for CHF exacerbation and workup s/p BiV PPM placement.        Problem List/Main Diagnoses (system-based):     1. acute on chronic CHF    2. A. fib        Inpatient treatment course: Patient admitted for for new echocardiogram findings, undergoing ischemic up and BiV placement. BNP 42495. Trops negx2 and CXR demonstrating cardiomegaly, thoracic aortic calcification and tortuosity; bibasilar opacities/pleural effusions. Patient actively diuresed with lasix 20mg IV push PRN, with improvement of bilateral bibasilar crackles and lower extremity edema, with increased range of motion. Echocardiogram indicated EF of 35%. Lexiscan indicated a small, fixed defect in the inferior and inferolateral walls which may represent infarction or attenuation artifact. Overall left ventricle is dilated and systolic  function is globally severely reduced, EF 29%. The ECG portion is non-diagnostic for ischemia given baseline LBBB. Patient underwent BiV pacemaker placement on 9/18 and cardiac catheterization. Significant oozing was noted at BiV PPM site, and dressing was changed several times. Lapses in memory noted on exam, with confusion and possible memory deficits, although baseline unclear. Patient reports questionable recent fall. CT head  notable for several old cortical infarcts in both cerebral hemispheres. No e/o of acute ICH, mass effect or CT evidence of recent infarction. Physical therapy evaluated patient, patient discharged to [facility] [place].        New medications:     Labs to be followed outpatient: BMP, CBC, Coags    Exam to be followed outpatient: Comprehensive physical exam; continue to monitor BiV site for oozing

## 2019-09-17 NOTE — PROGRESS NOTE ADULT - PROBLEM SELECTOR PLAN 1
Hx of CHF presenting from PCP office with bedside echo finding of 20%, from previous 50%, with increased GARCIA, difficulty walking, likely due to severe LE swelling and limited ADLs. Patient clinically overloaded with b/l LE edema on exam, with adequate.   - no IV lasix 20mg push today, patient to d/c on 20mg PO lasix, will transition 9/17  -c/w Toprol XL 50 mg daily  -c/w Lisinopril 2.5 mg daily  -EP consulted for possible pacemaker placement in setting of widened LBBB and low EF -> patient to undergo ischemic workup per reccs, pending LEXISCAN completion and results (Lexiscan completed half way 9/16) -> results of workup will determine further intervention/ischemic workup if ischemia noted or BiV pacemaker placement in setting of low EF (35%)

## 2019-09-17 NOTE — PROGRESS NOTE ADULT - PROBLEM SELECTOR PLAN 5
Lapses in memory noted on exam, with confusion and possible memory deficits. Unclear baseline. CT head (9/13) during this admission notable for several old cortical infarcts in both cerebral hemispheres. No e/o of acute ICH, mass effect or CT evidence of recent infarction.  -Continue to monitor; patient baseline appears AA)x3, with tangential conversations

## 2019-09-17 NOTE — PROGRESS NOTE ADULT - SUBJECTIVE AND OBJECTIVE BOX
OVERNIGHT EVENTS: No acute events overnight. Patient NPO for second part of Lexiscan as per patient request,    SUBJECTIVE / INTERVAL HPI: Patient seen and examined at bedside this AM. Patient doing well. Patient indicates her feet feel lighter and she is moving better. Denies CP, SOB, palpations, dizziness, n/v/c/d. ROS otherwise negative.     VITAL SIGNS:  Vital Signs Last 24 Hrs  T(C): 36.3 (17 Sep 2019 22:22), Max: 36.8 (17 Sep 2019 13:02)  T(F): 97.4 (17 Sep 2019 22:22), Max: 98.2 (17 Sep 2019 13:02)  HR: 60 (17 Sep 2019 20:40) (54 - 78)  BP: 74/46 (17 Sep 2019 20:40) (66/38 - 122/65)  BP(mean): 60 (17 Sep 2019 20:40) (51 - 89)  RR: 17 (17 Sep 2019 20:40) (15 - 23)  SpO2: 92% (17 Sep 2019 20:40) (92% - 95%)    PHYSICAL EXAM:    General: WDWN sitting up in chair by bedside, NAD; euvolemic in appearance  HEENT: NC/AT; PERRL, anicteric sclera; MMM  Neck: supple; no JVD; no cervical or submandibular lymphadnopathy  Cardiovascular: +S1/S2; RRR  Respiratory: CTA B/L; no W/R/R  Gastrointestinal: soft, NT/ND; +BSx4  Extremities: WWP; no edema,  Neurological: AAOx3; no focal deficits    MEDICATIONS:  MEDICATIONS  (STANDING):  apixaban 5 milliGRAM(s) Oral every 12 hours  atorvastatin 10 milliGRAM(s) Oral at bedtime  furosemide    Tablet 20 milliGRAM(s) Oral daily  lisinopril 2.5 milliGRAM(s) Oral daily  metoprolol succinate ER 50 milliGRAM(s) Oral daily  sertraline 50 milliGRAM(s) Oral daily    MEDICATIONS  (PRN):      ALLERGIES:  Allergies    No Known Allergies    Intolerances        LABS:                        12.8   7.32  )-----------( 231      ( 16 Sep 2019 06:21 )             40.5     09-16    139  |  102  |  19  ----------------------------<  91  4.0   |  28  |  0.79    Ca    8.9      16 Sep 2019 06:21  Mg     2.0     09-16          CAPILLARY BLOOD GLUCOSE          RADIOLOGY & ADDITIONAL TESTS: Reviewed.    ASSESSMENT:    PLAN:

## 2019-09-18 PROBLEM — I10 ESSENTIAL (PRIMARY) HYPERTENSION: Chronic | Status: ACTIVE | Noted: 2019-09-12

## 2019-09-18 PROBLEM — E78.00 PURE HYPERCHOLESTEROLEMIA, UNSPECIFIED: Chronic | Status: ACTIVE | Noted: 2019-09-12

## 2019-09-18 PROBLEM — I48.91 UNSPECIFIED ATRIAL FIBRILLATION: Chronic | Status: ACTIVE | Noted: 2019-09-12

## 2019-09-18 PROBLEM — I50.9 HEART FAILURE, UNSPECIFIED: Chronic | Status: ACTIVE | Noted: 2019-09-12

## 2019-09-18 PROBLEM — M81.0 AGE-RELATED OSTEOPOROSIS WITHOUT CURRENT PATHOLOGICAL FRACTURE: Chronic | Status: ACTIVE | Noted: 2019-09-12

## 2019-09-18 LAB
ANION GAP SERPL CALC-SCNC: 9 MMOL/L — SIGNIFICANT CHANGE UP (ref 5–17)
APTT BLD: 31.8 SEC — SIGNIFICANT CHANGE UP (ref 27.5–36.3)
BLD GP AB SCN SERPL QL: NEGATIVE — SIGNIFICANT CHANGE UP
BUN SERPL-MCNC: 20 MG/DL — SIGNIFICANT CHANGE UP (ref 7–23)
CALCIUM SERPL-MCNC: 8.6 MG/DL — SIGNIFICANT CHANGE UP (ref 8.4–10.5)
CHLORIDE SERPL-SCNC: 100 MMOL/L — SIGNIFICANT CHANGE UP (ref 96–108)
CO2 SERPL-SCNC: 29 MMOL/L — SIGNIFICANT CHANGE UP (ref 22–31)
CREAT SERPL-MCNC: 0.71 MG/DL — SIGNIFICANT CHANGE UP (ref 0.5–1.3)
GLUCOSE SERPL-MCNC: 93 MG/DL — SIGNIFICANT CHANGE UP (ref 70–99)
HCT VFR BLD CALC: 38.3 % — SIGNIFICANT CHANGE UP (ref 34.5–45)
HGB BLD-MCNC: 12.1 G/DL — SIGNIFICANT CHANGE UP (ref 11.5–15.5)
INR BLD: 1.62 — HIGH (ref 0.88–1.16)
MAGNESIUM SERPL-MCNC: 1.9 MG/DL — SIGNIFICANT CHANGE UP (ref 1.6–2.6)
MCHC RBC-ENTMCNC: 30.8 PG — SIGNIFICANT CHANGE UP (ref 27–34)
MCHC RBC-ENTMCNC: 31.6 GM/DL — LOW (ref 32–36)
MCV RBC AUTO: 97.5 FL — SIGNIFICANT CHANGE UP (ref 80–100)
NRBC # BLD: 0 /100 WBCS — SIGNIFICANT CHANGE UP (ref 0–0)
PLATELET # BLD AUTO: 200 K/UL — SIGNIFICANT CHANGE UP (ref 150–400)
POTASSIUM SERPL-MCNC: 4.2 MMOL/L — SIGNIFICANT CHANGE UP (ref 3.5–5.3)
POTASSIUM SERPL-SCNC: 4.2 MMOL/L — SIGNIFICANT CHANGE UP (ref 3.5–5.3)
PROTHROM AB SERPL-ACNC: 18.6 SEC — HIGH (ref 10–12.9)
RBC # BLD: 3.93 M/UL — SIGNIFICANT CHANGE UP (ref 3.8–5.2)
RBC # FLD: 15.2 % — HIGH (ref 10.3–14.5)
RH IG SCN BLD-IMP: POSITIVE — SIGNIFICANT CHANGE UP
SODIUM SERPL-SCNC: 138 MMOL/L — SIGNIFICANT CHANGE UP (ref 135–145)
WBC # BLD: 7.28 K/UL — SIGNIFICANT CHANGE UP (ref 3.8–10.5)
WBC # FLD AUTO: 7.28 K/UL — SIGNIFICANT CHANGE UP (ref 3.8–10.5)

## 2019-09-18 PROCEDURE — 33208 INSRT HEART PM ATRIAL & VENT: CPT | Mod: KX

## 2019-09-18 PROCEDURE — 33225 L VENTRIC PACING LEAD ADD-ON: CPT

## 2019-09-18 PROCEDURE — 93650 ICAR CATH ABLTJ AV NODE FUNC: CPT

## 2019-09-18 PROCEDURE — 99232 SBSQ HOSP IP/OBS MODERATE 35: CPT

## 2019-09-18 RX ORDER — VANCOMYCIN HCL 1 G
1000 VIAL (EA) INTRAVENOUS ONCE
Refills: 0 | Status: COMPLETED | OUTPATIENT
Start: 2019-09-18 | End: 2019-09-18

## 2019-09-18 RX ORDER — CEFAZOLIN SODIUM 1 G
1000 VIAL (EA) INJECTION EVERY 8 HOURS
Refills: 0 | Status: COMPLETED | OUTPATIENT
Start: 2019-09-18 | End: 2019-09-19

## 2019-09-18 RX ORDER — FUROSEMIDE 40 MG
20 TABLET ORAL ONCE
Refills: 0 | Status: COMPLETED | OUTPATIENT
Start: 2019-09-18 | End: 2019-09-18

## 2019-09-18 RX ORDER — FUROSEMIDE 40 MG
20 TABLET ORAL DAILY
Refills: 0 | Status: DISCONTINUED | OUTPATIENT
Start: 2019-09-19 | End: 2019-09-19

## 2019-09-18 RX ADMIN — Medication 100 MILLIGRAM(S): at 22:09

## 2019-09-18 RX ADMIN — Medication 20 MILLIGRAM(S): at 12:58

## 2019-09-18 RX ADMIN — APIXABAN 5 MILLIGRAM(S): 2.5 TABLET, FILM COATED ORAL at 06:55

## 2019-09-18 RX ADMIN — ATORVASTATIN CALCIUM 10 MILLIGRAM(S): 80 TABLET, FILM COATED ORAL at 22:08

## 2019-09-18 RX ADMIN — SERTRALINE 50 MILLIGRAM(S): 25 TABLET, FILM COATED ORAL at 12:58

## 2019-09-18 RX ADMIN — Medication 20 MILLIGRAM(S): at 06:56

## 2019-09-18 RX ADMIN — Medication 50 MILLIGRAM(S): at 12:58

## 2019-09-18 RX ADMIN — Medication 250 MILLIGRAM(S): at 23:02

## 2019-09-18 RX ADMIN — Medication 250 MILLIGRAM(S): at 09:12

## 2019-09-18 RX ADMIN — APIXABAN 5 MILLIGRAM(S): 2.5 TABLET, FILM COATED ORAL at 17:56

## 2019-09-18 RX ADMIN — LISINOPRIL 2.5 MILLIGRAM(S): 2.5 TABLET ORAL at 06:56

## 2019-09-18 NOTE — PROGRESS NOTE ADULT - SUBJECTIVE AND OBJECTIVE BOX
OVERNIGHT EVENTS: Pt hypotensive to 80s/30s overnight, asymptomatic and around baseline.    SUBJECTIVE / INTERVAL HPI: Patient seen and examined at bedside. States she is doing and breathing well. States she has a nonproductive cough but otherwise denied chest pain, lightheadedness, PND, orthopnea, fever, or pain.    VITAL SIGNS:  Vital Signs Last 24 Hrs  T(C): 36.4 (18 Sep 2019 05:39), Max: 36.8 (17 Sep 2019 13:02)  T(F): 97.6 (18 Sep 2019 05:39), Max: 98.2 (17 Sep 2019 13:02)  HR: 64 (18 Sep 2019 06:56) (54 - 78)  BP: 96/59 (18 Sep 2019 06:56) (66/38 - 122/65)  BP(mean): 75 (18 Sep 2019 06:56) (51 - 89)  RR: 16 (18 Sep 2019 06:56) (15 - 19)  SpO2: 94% (18 Sep 2019 06:56) (92% - 95%)    PHYSICAL EXAM:  General: WDWN; Patient is in NAD  HEENT: NC/AT; MMM  Neck: supple  Cardiovascular: +S1/S2, RRR  Respiratory: bibasilar crackles   Gastrointestinal: soft, NT/ND; +BS  Extremities: WWP; 2+ pitting edema to mid-shin  Vascular: 2+ radial pulses B/L  Neurological: AAOx3; no focal deficits; CN II-XII grossly intact    MEDICATIONS:  MEDICATIONS  (STANDING):  apixaban 5 milliGRAM(s) Oral every 12 hours  atorvastatin 10 milliGRAM(s) Oral at bedtime  furosemide   Injectable 20 milliGRAM(s) IV Push once  lisinopril 2.5 milliGRAM(s) Oral daily  metoprolol succinate ER 50 milliGRAM(s) Oral daily  sertraline 50 milliGRAM(s) Oral daily  vancomycin  IVPB 1000 milliGRAM(s) IV Intermittent once    MEDICATIONS  (PRN):      ALLERGIES:  Allergies    No Known Allergies    Intolerances        LABS:                        12.1   7.28  )-----------( 200      ( 18 Sep 2019 07:24 )             38.3     09-18    138  |  100  |  20  ----------------------------<  93  4.2   |  29  |  0.71    Ca    8.6      18 Sep 2019 07:24  Mg     1.9     09-18      PT/INR - ( 18 Sep 2019 07:24 )   PT: 18.6 sec;   INR: 1.62          PTT - ( 18 Sep 2019 07:24 )  PTT:31.8 sec    CAPILLARY BLOOD GLUCOSE          RADIOLOGY & ADDITIONAL TESTS: Reviewed.

## 2019-09-18 NOTE — PROGRESS NOTE ADULT - PROBLEM SELECTOR PLAN 1
Hx of CHF presenting from PCP office with bedside echo finding of 20%, from previous 50%, with increased GARCIA, difficulty walking, likely due to severe LE swelling and limited ADLs. Patient clinically overloaded with b/l LE edema on exam, with adequate.   - ordered IV lasix   patient to d/c on 20mg PO lasix, will transition 9/17  -c/w Toprol XL 50 mg daily  -c/w Lisinopril 2.5 mg daily  -EP consulted for possible pacemaker placement in setting of widened LBBB and low EF -> patient to undergo ischemic workup per reccs, pending LEXISCAN completion and results (Lexiscan completed half way 9/16) -> results of workup will determine further intervention/ischemic workup if ischemia noted or BiV pacemaker placement in setting of low EF (35%) Hx of CHF presenting from PCP office with bedside echo finding of 20%, from previous 50%, with increased GARCIA, difficulty walking, likely due to severe LE swelling and limited ADLs. Patient clinically overloaded with b/l LE edema on exam.  - ordered IV Lasix 20 mg x 1 dose  - patient to d/c on 20mg PO lasix  -c/w Toprol XL 50 mg daily  -c/w Lisinopril 2.5 mg daily  -EP consulted for possible pacemaker placement in setting of widened LBBB and low EF -> LEXISCAN not showing deficits, pt to receive BiV PPM today  -will monitor pt post-procedure with possible discharge tomorrow

## 2019-09-18 NOTE — PROGRESS NOTE ADULT - PROBLEM SELECTOR PLAN 5
Lapses in memory noted on exam, with confusion and possible memory deficits. Unclear baseline. CT head (9/13) during this admission notable for several old cortical infarcts in both cerebral hemispheres. No e/o of acute ICH, mass effect or CT evidence of recent infarction.  -Continue to monitor; patient baseline appears AA)x3, with tangential conversations Lapses in memory noted on exam, with confusion and possible memory deficits. Unclear baseline. CT head (9/13) during this admission notable for several old cortical infarcts in both cerebral hemispheres. No e/o of acute ICH, mass effect or CT evidence of recent infarction.  -Continue to monitor; patient baseline appears AAOx3, with tangential conversations

## 2019-09-18 NOTE — PROGRESS NOTE ADULT - SUBJECTIVE AND OBJECTIVE BOX
INTERVAL HISTORY:  for BiV pacer  	  MEDICATIONS:  furosemide    Tablet 20 milliGRAM(s) Oral daily  lisinopril 2.5 milliGRAM(s) Oral daily  metoprolol succinate ER 50 milliGRAM(s) Oral daily    vancomycin  IVPB 1000 milliGRAM(s) IV Intermittent once  vancomycin  IVPB 1000 milliGRAM(s) IV Intermittent once      sertraline 50 milliGRAM(s) Oral daily      atorvastatin 10 milliGRAM(s) Oral at bedtime    apixaban 5 milliGRAM(s) Oral every 12 hours        PHYSICAL EXAM:  T(C): 36.4 (09-18-19 @ 05:39), Max: 36.8 (09-17-19 @ 13:02)  HR: 64 (09-18-19 @ 06:56) (54 - 78)  BP: 96/59 (09-18-19 @ 06:56) (66/38 - 122/65)  RR: 16 (09-18-19 @ 06:56) (15 - 23)  SpO2: 94% (09-18-19 @ 06:56) (92% - 95%)  Wt(kg): --  I&O's Summary    17 Sep 2019 07:01  -  18 Sep 2019 07:00  --------------------------------------------------------  IN: 420 mL / OUT: 800 mL / NET: -380 mL          Appearance: Normal	  HEENT:   Normal oral mucosa, PERRL, EOMI	  Lymphatic: No lymphadenopathy  Cardiovascular: Irregular, variable S1 S2, No JVD, ASM, + edema  Respiratory: Lungs clear to auscultation	  Psychiatry: A & O x 3, Mood & affect appropriate  Gastrointestinal:  Soft, Non-tender, + BS	  Skin: No rashes, No ecchymoses, No cyanosis  Neurologic: Non-focal  Extremities: Normal range of motion, No clubbing, cyanosis, + edema  Vascular: Peripheral pulses palpable 2+ bilaterally    TELEMETRY: 	    ECG:  	  RADIOLOGY:   DIAGNOSTIC TESTING:  [ ] Echocardiogram:  [ ]  Catheterization:  [ ] Stress Test:    OTHER: 	    LABS:	 	    CARDIAC MARKERS:                                  12.1   7.28  )-----------( 200      ( 18 Sep 2019 07:24 )             38.3     09-18    138  |  100  |  20  ----------------------------<  93  4.2   |  29  |  0.71    Ca    8.6      18 Sep 2019 07:24  Mg     1.9     09-18      proBNP:   Lipid Profile:   HgA1c:   TSH:     ASSESSMENT/PLAN:

## 2019-09-18 NOTE — PROGRESS NOTE ADULT - ASSESSMENT
83 year old female with medical history of a.fib (on eliquis), multiple CVAs, HTN, HLD, and memory deficits (?dementia) admitted from Dr. Rajput's office after presenting with GARCIA of one week's duration, found to have EF of 20% and moderate mitral regurgitation on bedside ECHO and widened LBBB on EKG. Patient admitted to telemetry for cardiac monitoring and diuresis undergoing ischemic workup. Scheduled for Bi-V placement today.

## 2019-09-19 VITALS — TEMPERATURE: 98 F

## 2019-09-19 LAB
GLUCOSE BLDC GLUCOMTR-MCNC: 90 MG/DL — SIGNIFICANT CHANGE UP (ref 70–99)
HCT VFR BLD CALC: 41.8 % — SIGNIFICANT CHANGE UP (ref 34.5–45)
HGB BLD-MCNC: 12.6 G/DL — SIGNIFICANT CHANGE UP (ref 11.5–15.5)
MCHC RBC-ENTMCNC: 30.1 GM/DL — LOW (ref 32–36)
MCHC RBC-ENTMCNC: 30.7 PG — SIGNIFICANT CHANGE UP (ref 27–34)
MCV RBC AUTO: 101.7 FL — HIGH (ref 80–100)
NRBC # BLD: 0 /100 WBCS — SIGNIFICANT CHANGE UP (ref 0–0)
PLATELET # BLD AUTO: 192 K/UL — SIGNIFICANT CHANGE UP (ref 150–400)
RBC # BLD: 4.11 M/UL — SIGNIFICANT CHANGE UP (ref 3.8–5.2)
RBC # FLD: 15.1 % — HIGH (ref 10.3–14.5)
WBC # BLD: 11.14 K/UL — HIGH (ref 3.8–10.5)
WBC # FLD AUTO: 11.14 K/UL — HIGH (ref 3.8–10.5)

## 2019-09-19 PROCEDURE — 71046 X-RAY EXAM CHEST 2 VIEWS: CPT | Mod: 26

## 2019-09-19 PROCEDURE — 99238 HOSP IP/OBS DSCHRG MGMT 30/<: CPT

## 2019-09-19 RX ORDER — AMPICILLIN TRIHYDRATE 250 MG
0 CAPSULE ORAL
Qty: 0 | Refills: 0 | DISCHARGE

## 2019-09-19 RX ORDER — METOPROLOL TARTRATE 50 MG
1 TABLET ORAL
Qty: 0 | Refills: 0 | DISCHARGE

## 2019-09-19 RX ORDER — SODIUM CHLORIDE 9 MG/ML
250 INJECTION INTRAMUSCULAR; INTRAVENOUS; SUBCUTANEOUS ONCE
Refills: 0 | Status: COMPLETED | OUTPATIENT
Start: 2019-09-19 | End: 2019-09-19

## 2019-09-19 RX ORDER — APIXABAN 2.5 MG/1
1 TABLET, FILM COATED ORAL
Qty: 0 | Refills: 0 | DISCHARGE
Start: 2019-09-19

## 2019-09-19 RX ORDER — APIXABAN 2.5 MG/1
0 TABLET, FILM COATED ORAL
Qty: 0 | Refills: 0 | DISCHARGE

## 2019-09-19 RX ADMIN — SODIUM CHLORIDE 250 MILLILITER(S): 9 INJECTION INTRAMUSCULAR; INTRAVENOUS; SUBCUTANEOUS at 14:44

## 2019-09-19 RX ADMIN — SERTRALINE 50 MILLIGRAM(S): 25 TABLET, FILM COATED ORAL at 11:44

## 2019-09-19 RX ADMIN — Medication 100 MILLIGRAM(S): at 14:48

## 2019-09-19 RX ADMIN — LISINOPRIL 2.5 MILLIGRAM(S): 2.5 TABLET ORAL at 06:25

## 2019-09-19 RX ADMIN — Medication 20 MILLIGRAM(S): at 06:25

## 2019-09-19 RX ADMIN — Medication 100 MILLIGRAM(S): at 06:25

## 2019-09-19 NOTE — PROGRESS NOTE ADULT - PROBLEM SELECTOR PROBLEM 1
LBBB (left bundle branch block)
CHF (congestive heart failure)

## 2019-09-19 NOTE — PROGRESS NOTE ADULT - PROBLEM SELECTOR PLAN 1
recommend BiV pacer for EF 20%.  Will also need an ischemia w/u.  Nuc non-ischemia.  Hold Eliquis if needed, did have a CVA in the past.  s/p BiV pacer, some bleeding at pacer insertion site.  Hold Eliquis as needed.  Discharge planning.

## 2019-09-19 NOTE — PROGRESS NOTE ADULT - SUBJECTIVE AND OBJECTIVE BOX
INTERVAL HISTORY:  s/p BiV paqssing  	  MEDICATIONS:  furosemide    Tablet 20 milliGRAM(s) Oral daily  lisinopril 2.5 milliGRAM(s) Oral daily  metoprolol succinate ER 50 milliGRAM(s) Oral daily    ceFAZolin   IVPB 1000 milliGRAM(s) IV Intermittent every 8 hours      sertraline 50 milliGRAM(s) Oral daily      atorvastatin 10 milliGRAM(s) Oral at bedtime    apixaban 5 milliGRAM(s) Oral every 12 hours        PHYSICAL EXAM:  T(C): 36.3 (09-18-19 @ 22:02), Max: 37.2 (09-18-19 @ 12:52)  HR: 90 (09-19-19 @ 05:05) (64 - 90)  BP: 86/63 (09-19-19 @ 05:05) (74/42 - 98/53)  RR: 18 (09-19-19 @ 05:05) (16 - 20)  SpO2: 88% (09-19-19 @ 05:05) (88% - 96%)  Wt(kg): --  I&O's Summary    17 Sep 2019 07:01  -  18 Sep 2019 07:00  --------------------------------------------------------  IN: 420 mL / OUT: 800 mL / NET: -380 mL    18 Sep 2019 07:01  -  19 Sep 2019 06:43  --------------------------------------------------------  IN: 590 mL / OUT: 400 mL / NET: 190 mL          Appearance: Normal	  HEENT:   Normal oral mucosa, PERRL, EOMI	  Lymphatic: No lymphadenopathy  Cardiovascular: Normal S1 S2, No JVD, ASM, trace edema  Respiratory: Lungs clear to auscultation	  Psychiatry: A & O x 3, Mood & affect appropriate  Gastrointestinal:  Soft, Non-tender, + BS	  Skin: No rashes, No ecchymoses, No cyanosis  Neurologic: Non-focal  Extremities: Normal range of motion, No clubbing, cyanosis   Vascular: Peripheral pulses palpable 2+ bilaterally    TELEMETRY: 	    ECG:  	  RADIOLOGY:   DIAGNOSTIC TESTING:  [ ] Echocardiogram:  [ ]  Catheterization:  [ ] Stress Test:    OTHER: 	    LABS:	 	    CARDIAC MARKERS:                                  12.6   11.14 )-----------( 192      ( 19 Sep 2019 06:02 )             41.8     09-18    138  |  100  |  20  ----------------------------<  93  4.2   |  29  |  0.71    Ca    8.6      18 Sep 2019 07:24  Mg     1.9     09-18      proBNP:   Lipid Profile:   HgA1c:   TSH:     ASSESSMENT/PLAN:

## 2019-09-19 NOTE — PROGRESS NOTE ADULT - REASON FOR ADMISSION
CHF, low EF
CHF, low EF, LBBB
CHF, low EF

## 2019-09-19 NOTE — PROGRESS NOTE ADULT - PROBLEM SELECTOR PROBLEM 2
Acute on chronic systolic congestive heart failure
Afib

## 2019-09-19 NOTE — PROGRESS NOTE ADULT - PROBLEM SELECTOR PROBLEM 3
Chronic atrial fibrillation
Osteoporosis

## 2019-09-19 NOTE — PROVIDER CONTACT NOTE (CHANGE IN STATUS NOTIFICATION) - SITUATION
Pt bleeding from pressure dressing for PPM in L upper chest. Pt was oozing blood after PPM procedure today. Pressure dressing was placed, last checked at 0:00, no bleeding noted. VSS. pt denies pain/discomfort. Extremities warm. Replace pressure dressing and reassess.

## 2019-09-26 ENCOUNTER — APPOINTMENT (OUTPATIENT)
Dept: HEART AND VASCULAR | Facility: CLINIC | Age: 84
End: 2019-09-26

## 2019-10-01 DIAGNOSIS — F03.90 UNSPECIFIED DEMENTIA WITHOUT BEHAVIORAL DISTURBANCE: ICD-10-CM

## 2019-10-01 DIAGNOSIS — Z86.73 PERSONAL HISTORY OF TRANSIENT ISCHEMIC ATTACK (TIA), AND CEREBRAL INFARCTION WITHOUT RESIDUAL DEFICITS: ICD-10-CM

## 2019-10-01 DIAGNOSIS — I42.9 CARDIOMYOPATHY, UNSPECIFIED: ICD-10-CM

## 2019-10-01 DIAGNOSIS — I27.20 PULMONARY HYPERTENSION, UNSPECIFIED: ICD-10-CM

## 2019-10-01 DIAGNOSIS — I50.23 ACUTE ON CHRONIC SYSTOLIC (CONGESTIVE) HEART FAILURE: ICD-10-CM

## 2019-10-01 DIAGNOSIS — I95.9 HYPOTENSION, UNSPECIFIED: ICD-10-CM

## 2019-10-01 DIAGNOSIS — M81.0 AGE-RELATED OSTEOPOROSIS WITHOUT CURRENT PATHOLOGICAL FRACTURE: ICD-10-CM

## 2019-10-01 DIAGNOSIS — E78.00 PURE HYPERCHOLESTEROLEMIA, UNSPECIFIED: ICD-10-CM

## 2019-10-01 DIAGNOSIS — I44.7 LEFT BUNDLE-BRANCH BLOCK, UNSPECIFIED: ICD-10-CM

## 2019-10-01 DIAGNOSIS — I11.0 HYPERTENSIVE HEART DISEASE WITH HEART FAILURE: ICD-10-CM

## 2019-10-01 DIAGNOSIS — Z79.01 LONG TERM (CURRENT) USE OF ANTICOAGULANTS: ICD-10-CM

## 2019-10-01 DIAGNOSIS — I48.2 CHRONIC ATRIAL FIBRILLATION: ICD-10-CM

## 2019-10-17 ENCOUNTER — APPOINTMENT (OUTPATIENT)
Dept: HEART AND VASCULAR | Facility: CLINIC | Age: 84
End: 2019-10-17

## 2019-10-25 PROCEDURE — 84484 ASSAY OF TROPONIN QUANT: CPT

## 2019-10-25 PROCEDURE — 86900 BLOOD TYPING SEROLOGIC ABO: CPT

## 2019-10-25 PROCEDURE — A9500: CPT

## 2019-10-25 PROCEDURE — 93017 CV STRESS TEST TRACING ONLY: CPT

## 2019-10-25 PROCEDURE — 85730 THROMBOPLASTIN TIME PARTIAL: CPT

## 2019-10-25 PROCEDURE — C8929: CPT

## 2019-10-25 PROCEDURE — 85027 COMPLETE CBC AUTOMATED: CPT

## 2019-10-25 PROCEDURE — 70450 CT HEAD/BRAIN W/O DYE: CPT

## 2019-10-25 PROCEDURE — 85610 PROTHROMBIN TIME: CPT

## 2019-10-25 PROCEDURE — 90686 IIV4 VACC NO PRSV 0.5 ML IM: CPT

## 2019-10-25 PROCEDURE — 71045 X-RAY EXAM CHEST 1 VIEW: CPT

## 2019-10-25 PROCEDURE — 71046 X-RAY EXAM CHEST 2 VIEWS: CPT

## 2019-10-25 PROCEDURE — C1769: CPT

## 2019-10-25 PROCEDURE — 78452 HT MUSCLE IMAGE SPECT MULT: CPT

## 2019-10-25 PROCEDURE — C1900: CPT

## 2019-10-25 PROCEDURE — 97110 THERAPEUTIC EXERCISES: CPT

## 2019-10-25 PROCEDURE — 84100 ASSAY OF PHOSPHORUS: CPT

## 2019-10-25 PROCEDURE — 85025 COMPLETE CBC W/AUTO DIFF WBC: CPT

## 2019-10-25 PROCEDURE — 93005 ELECTROCARDIOGRAM TRACING: CPT

## 2019-10-25 PROCEDURE — 99285 EMERGENCY DEPT VISIT HI MDM: CPT | Mod: 25

## 2019-10-25 PROCEDURE — 80053 COMPREHEN METABOLIC PANEL: CPT

## 2019-10-25 PROCEDURE — 80048 BASIC METABOLIC PNL TOTAL CA: CPT

## 2019-10-25 PROCEDURE — C1894: CPT

## 2019-10-25 PROCEDURE — 83880 ASSAY OF NATRIURETIC PEPTIDE: CPT

## 2019-10-25 PROCEDURE — 82550 ASSAY OF CK (CPK): CPT

## 2019-10-25 PROCEDURE — A9505: CPT

## 2019-10-25 PROCEDURE — 82553 CREATINE MB FRACTION: CPT

## 2019-10-25 PROCEDURE — C2621: CPT

## 2019-10-25 PROCEDURE — 83735 ASSAY OF MAGNESIUM: CPT

## 2019-10-25 PROCEDURE — C1892: CPT

## 2019-10-25 PROCEDURE — C1898: CPT

## 2019-10-25 PROCEDURE — 97164 PT RE-EVAL EST PLAN CARE: CPT

## 2019-10-25 PROCEDURE — C1730: CPT

## 2019-10-25 PROCEDURE — C1733: CPT

## 2019-10-25 PROCEDURE — 36415 COLL VENOUS BLD VENIPUNCTURE: CPT

## 2019-10-25 PROCEDURE — C1887: CPT

## 2019-10-25 PROCEDURE — 97116 GAIT TRAINING THERAPY: CPT

## 2019-10-25 PROCEDURE — 86850 RBC ANTIBODY SCREEN: CPT

## 2019-10-25 PROCEDURE — 86901 BLOOD TYPING SEROLOGIC RH(D): CPT

## 2019-10-25 PROCEDURE — 97161 PT EVAL LOW COMPLEX 20 MIN: CPT

## 2019-10-25 PROCEDURE — 82962 GLUCOSE BLOOD TEST: CPT

## 2019-11-04 ENCOUNTER — APPOINTMENT (OUTPATIENT)
Dept: HEART AND VASCULAR | Facility: CLINIC | Age: 84
End: 2019-11-04

## 2019-11-26 ENCOUNTER — MEDICATION RENEWAL (OUTPATIENT)
Age: 84
End: 2019-11-26

## 2019-11-26 RX ORDER — FUROSEMIDE 20 MG/1
20 TABLET ORAL DAILY
Qty: 90 | Refills: 3 | Status: ACTIVE | COMMUNITY
Start: 1900-01-01 | End: 1900-01-01

## 2019-11-26 RX ORDER — APIXABAN 2.5 MG/1
2.5 TABLET, FILM COATED ORAL
Qty: 180 | Refills: 3 | Status: ACTIVE | COMMUNITY
Start: 2017-03-21 | End: 1900-01-01

## 2019-12-19 ENCOUNTER — APPOINTMENT (OUTPATIENT)
Dept: HEART AND VASCULAR | Facility: CLINIC | Age: 84
End: 2019-12-19

## 2020-01-03 ENCOUNTER — RX RENEWAL (OUTPATIENT)
Age: 85
End: 2020-01-03

## 2020-01-03 RX ORDER — SERTRALINE HYDROCHLORIDE 50 MG/1
50 TABLET, FILM COATED ORAL
Qty: 90 | Refills: 3 | Status: ACTIVE | COMMUNITY
Start: 2017-12-12 | End: 1900-01-01

## 2020-04-02 ENCOUNTER — RX RENEWAL (OUTPATIENT)
Age: 85
End: 2020-04-02

## 2020-04-02 RX ORDER — LISINOPRIL 2.5 MG/1
2.5 TABLET ORAL DAILY
Qty: 90 | Refills: 3 | Status: ACTIVE | COMMUNITY
Start: 2017-05-08 | End: 1900-01-01

## 2021-06-02 NOTE — PHYSICAL EXAM
[General Appearance - Well Developed] : well developed [Normal Appearance] : normal appearance [Well Groomed] : well groomed [General Appearance - Well Nourished] : well nourished [No Deformities] : no deformities [General Appearance - In No Acute Distress] : no acute distress [Normal Conjunctiva] : the conjunctiva exhibited no abnormalities [Eyelids - No Xanthelasma] : the eyelids demonstrated no xanthelasmas [Normal Oral Mucosa] : normal oral mucosa [No Oral Pallor] : no oral pallor [No Oral Cyanosis] : no oral cyanosis [Respiration, Rhythm And Depth] : normal respiratory rhythm and effort [Exaggerated Use Of Accessory Muscles For Inspiration] : no accessory muscle use [Auscultation Breath Sounds / Voice Sounds] : lungs were clear to auscultation bilaterally [Abdomen Soft] : soft [Abdomen Tenderness] : non-tender [Abdomen Mass (___ Cm)] : no abdominal mass palpated [Abnormal Walk] : normal gait [Nail Clubbing] : no clubbing of the fingernails [Petechial Hemorrhages (___cm)] : no petechial hemorrhages [Cyanosis, Localized] : no localized cyanosis [Skin Color & Pigmentation] : normal skin color and pigmentation [] : no rash [No Venous Stasis] : no venous stasis [Skin Lesions] : no skin lesions [No Skin Ulcers] : no skin ulcer [No Xanthoma] : no  xanthoma was observed [Oriented To Time, Place, And Person] : oriented to person, place, and time [Affect] : the affect was normal [Mood] : the mood was normal [FreeTextEntry1] : No JVD or bruits  S/p hip surgery, HTN

## 2021-10-06 PROBLEM — I10 ESSENTIAL HYPERTENSION: Status: ACTIVE | Noted: 2017-10-10

## 2022-02-17 NOTE — CONSULT NOTE ADULT - PROBLEM SELECTOR PROBLEM 1
Quality 110: Preventive Care And Screening: Influenza Immunization: Influenza immunization was not ordered or administered, reason not given
Quality 226: Preventive Care And Screening: Tobacco Use: Screening And Cessation Intervention: Patient screened for tobacco use and is an ex/non-smoker
Detail Level: Detailed
LBBB (left bundle branch block)
Quality 130: Documentation Of Current Medications In The Medical Record: Current Medications Documented
Quality 431: Preventive Care And Screening: Unhealthy Alcohol Use - Screening: Patient not identified as an unhealthy alcohol user when screened for unhealthy alcohol use using a systematic screening method

## 2022-06-14 NOTE — PROGRESS NOTE ADULT - SUBJECTIVE AND OBJECTIVE BOX
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    Battery status: 7 years  Interrogating parameters:   				RA			RV			LV    Sense:                                                                            11.1  mV                   2.8 mV    Threshold:                                                                     0.5 V @ 0.5 ms     1.6 V @ 0.5 ms    Pacing Impedance:                                                                875om                  879   om    Shock Impedance:                                                                              n/a    Events/Alert:  none    Parameter change: 	none    Normal function BiV PPM  dressing changed , note oozing  at upper corner of the incision   [ ]EPS attending: Interrogation reviewed. Agree with above. Star Wedge Flap Text: The defect edges were debeveled with a #15 scalpel blade.  Given the location of the defect, shape of the defect and the proximity to free margins a star wedge flap was deemed most appropriate.  Using a sterile surgical marker, an appropriate rotation flap was drawn incorporating the defect and placing the expected incisions within the relaxed skin tension lines where possible. The area thus outlined was incised deep to adipose tissue with a #15 scalpel blade.  The skin margins were undermined to an appropriate distance in all directions utilizing iris scissors.

## 2024-10-02 NOTE — PROGRESS NOTE ADULT - PROBLEM SELECTOR PLAN 7
"Returned call, answered all questions asked by patient. Patient verbalized understanding and expressed his appreciation.     ----- Message from Tee sent at 10/2/2024  8:53 AM CDT -----  Regarding: call back  Consult/Advisory:        Name Of Caller: Self     Contact Preference?:[phone]     What is the nature of the call?: Calling to speak w/someone in regards to some question he has requesting call back      Additional Notes:  "Thank you for all that you do for our patients"  "
1) PCP Contacted on Admission: (Y/N) --> Name & Phone #: Dr. Rajput  2) Date of Contact with PCP: 9/12/13  3) PCP Contacted at Discharge: (Y/N)  4) Summary of Handoff Given to PCP:   5) Post-Discharge Appointment Date and Location:

## 2025-02-13 NOTE — ED ADULT NURSE NOTE - CHPI ED NUR SYMPTOMS NEG
For your respiratory symptoms:    Use a humidifier or vaporizer in bedroom at night time  Purchase an inexpensive humidity gauge for the bedroom.      -  Greater than 50% humidity will help.      -  Less than 50% humidity can worsen symptoms  Drink plenty of fluids  Guaifenesin (Mucinex) 1200 mg twice a day to promote sinus drainage.  Naproxen (Aleve) 440 mg twice a day  Hold off on using any decongestants during this phase of the illness (Sudafed, pseudoephedrine, PSE, phenylephrine, etc) These will tend to dry you out and can actually make your symptoms worse. At this point, you will want to thin the mucus and not thicken it. While you may get some temporary relief with decongestants, when the medication wears off, the symptoms seem to be worse than before.  Take the antibiotics as prescribed.    Call or return to the clinic or your regular doctor if symptoms worsen, or new symptoms develop.    
no chest pain/no chills/no diaphoresis/no congestion/no dizziness/no fever/no nausea/no syncope/no back pain/no vomiting